# Patient Record
Sex: FEMALE | Race: WHITE | NOT HISPANIC OR LATINO | Employment: OTHER | URBAN - METROPOLITAN AREA
[De-identification: names, ages, dates, MRNs, and addresses within clinical notes are randomized per-mention and may not be internally consistent; named-entity substitution may affect disease eponyms.]

---

## 2023-01-25 PROBLEM — G89.29 CHRONIC PAIN OF BOTH SHOULDERS: Status: ACTIVE | Noted: 2023-01-25

## 2023-01-25 PROBLEM — L98.9 SKIN LESION OF HAND: Status: ACTIVE | Noted: 2023-01-25

## 2023-01-25 PROBLEM — F32.A DEPRESSION: Status: ACTIVE | Noted: 2023-01-25

## 2023-01-25 PROBLEM — M54.9 BACK PAIN: Status: ACTIVE | Noted: 2023-01-25

## 2023-01-25 PROBLEM — R79.89 ELEVATED TSH: Status: ACTIVE | Noted: 2023-01-25

## 2023-01-25 PROBLEM — M25.511 CHRONIC PAIN OF BOTH SHOULDERS: Status: ACTIVE | Noted: 2023-01-25

## 2023-01-25 PROBLEM — M79.604 RIGHT LEG PAIN: Status: ACTIVE | Noted: 2023-01-25

## 2023-01-25 PROBLEM — M25.551 PAIN IN RIGHT HIP: Status: ACTIVE | Noted: 2023-01-25

## 2023-01-25 PROBLEM — I51.9 IMPAIRED LEFT VENTRICULAR RELAXATION: Status: ACTIVE | Noted: 2023-01-25

## 2023-01-25 PROBLEM — K21.9 GERD (GASTROESOPHAGEAL REFLUX DISEASE): Status: ACTIVE | Noted: 2023-01-25

## 2023-01-25 PROBLEM — M16.0 PRIMARY LOCALIZED OSTEOARTHRITIS OF HIPS, BILATERAL: Status: ACTIVE | Noted: 2023-01-25

## 2023-01-25 PROBLEM — M79.89 LEG SWELLING: Status: ACTIVE | Noted: 2023-01-25

## 2023-01-25 PROBLEM — M19.011 OSTEOARTHRITIS OF BOTH SHOULDERS: Status: ACTIVE | Noted: 2023-01-25

## 2023-01-25 PROBLEM — R73.03 PREDIABETES: Status: ACTIVE | Noted: 2023-01-25

## 2023-01-25 PROBLEM — I10 BENIGN ESSENTIAL HYPERTENSION: Status: ACTIVE | Noted: 2023-01-25

## 2023-01-25 PROBLEM — M81.0 OSTEOPOROSIS: Status: ACTIVE | Noted: 2023-01-25

## 2023-01-25 PROBLEM — M19.012 OSTEOARTHRITIS OF BOTH SHOULDERS: Status: ACTIVE | Noted: 2023-01-25

## 2023-01-25 PROBLEM — I51.89 DIASTOLIC DYSFUNCTION: Status: ACTIVE | Noted: 2023-01-25

## 2023-01-25 PROBLEM — I73.9 CLAUDICATION OF LOWER EXTREMITY (CMS-HCC): Status: ACTIVE | Noted: 2023-01-25

## 2023-01-25 PROBLEM — L30.9 DERMATITIS: Status: ACTIVE | Noted: 2023-01-25

## 2023-01-25 PROBLEM — M25.512 CHRONIC PAIN OF BOTH SHOULDERS: Status: ACTIVE | Noted: 2023-01-25

## 2023-01-25 PROBLEM — K59.09 CHRONIC CONSTIPATION: Status: ACTIVE | Noted: 2023-01-25

## 2023-01-25 RX ORDER — CLONIDINE HYDROCHLORIDE 0.1 MG/1
1 TABLET ORAL NIGHTLY
COMMUNITY
Start: 2020-12-08 | End: 2023-10-26 | Stop reason: SDUPTHER

## 2023-01-25 RX ORDER — ALENDRONATE SODIUM 70 MG/1
1 TABLET ORAL
COMMUNITY
Start: 2020-12-08 | End: 2023-03-08 | Stop reason: SDUPTHER

## 2023-01-25 RX ORDER — BUPROPION HYDROCHLORIDE 300 MG/1
1 TABLET ORAL DAILY
COMMUNITY
Start: 2020-12-08 | End: 2023-03-08 | Stop reason: SDUPTHER

## 2023-01-25 RX ORDER — AMLODIPINE BESYLATE 5 MG/1
1 TABLET ORAL DAILY
COMMUNITY
Start: 2021-05-04 | End: 2024-05-13 | Stop reason: SDUPTHER

## 2023-01-25 RX ORDER — BISACODYL 5 MG
TABLET, DELAYED RELEASE (ENTERIC COATED) ORAL SEE ADMIN INSTRUCTIONS
COMMUNITY
Start: 2022-10-20

## 2023-01-25 RX ORDER — OMEPRAZOLE 20 MG/1
1 CAPSULE, DELAYED RELEASE ORAL
COMMUNITY
Start: 2020-12-08 | End: 2023-05-04 | Stop reason: SDUPTHER

## 2023-01-25 RX ORDER — TRIAMCINOLONE ACETONIDE 1 MG/G
CREAM TOPICAL 2 TIMES DAILY
COMMUNITY
Start: 2021-06-22

## 2023-01-25 RX ORDER — FUROSEMIDE 20 MG/1
2 TABLET ORAL DAILY
COMMUNITY
Start: 2022-04-04 | End: 2023-12-07 | Stop reason: SDUPTHER

## 2023-01-25 RX ORDER — OLANZAPINE 10 MG/1
1 TABLET ORAL NIGHTLY
COMMUNITY
Start: 2020-12-08 | End: 2023-04-11 | Stop reason: SDUPTHER

## 2023-01-25 RX ORDER — CROMOLYN SODIUM 40 MG/ML
1 SOLUTION/ DROPS OPHTHALMIC NIGHTLY
COMMUNITY
Start: 2021-12-02

## 2023-01-25 RX ORDER — SPIRONOLACTONE 25 MG/1
1 TABLET ORAL DAILY
COMMUNITY
Start: 2022-05-03 | End: 2024-04-09 | Stop reason: SDUPTHER

## 2023-03-01 LAB
ANION GAP IN SER/PLAS: 15 MMOL/L (ref 10–20)
CALCIDIOL (25 OH VITAMIN D3) (NG/ML) IN SER/PLAS: 33 NG/ML
CALCIUM (MG/DL) IN SER/PLAS: 9.6 MG/DL (ref 8.6–10.6)
CARBON DIOXIDE, TOTAL (MMOL/L) IN SER/PLAS: 25 MMOL/L (ref 21–32)
CHLORIDE (MMOL/L) IN SER/PLAS: 100 MMOL/L (ref 98–107)
CHOLESTEROL (MG/DL) IN SER/PLAS: 188 MG/DL (ref 0–199)
CHOLESTEROL IN HDL (MG/DL) IN SER/PLAS: 95.3 MG/DL
CHOLESTEROL/HDL RATIO: 2
CREATININE (MG/DL) IN SER/PLAS: 0.91 MG/DL (ref 0.5–1.05)
GFR FEMALE: 66 ML/MIN/1.73M2
GLUCOSE (MG/DL) IN SER/PLAS: 85 MG/DL (ref 74–99)
LDL: 83 MG/DL (ref 0–99)
POTASSIUM (MMOL/L) IN SER/PLAS: 4.3 MMOL/L (ref 3.5–5.3)
SODIUM (MMOL/L) IN SER/PLAS: 136 MMOL/L (ref 136–145)
TRIGLYCERIDE (MG/DL) IN SER/PLAS: 51 MG/DL (ref 0–149)
UREA NITROGEN (MG/DL) IN SER/PLAS: 19 MG/DL (ref 6–23)
VLDL: 10 MG/DL (ref 0–40)

## 2023-03-07 NOTE — PROGRESS NOTES
Subjective   6 month routine f/u visit    HPI   73 yo female presents for 6 MO F/U    PMH: OSTEOPOROSIS, HTN, ANXIETY/DEPRESSION, LE EDEMA, GERD, CHRONIC SHOULDER PAIN, ECZEMA    #PRE-DIABETES  A1C  due today; was 5.7%  in Sept 2022    #OSTEOPOROSIS  DEXA= 2021  taking Fosamax weekly; Saturday   BMI= 24  exercise: not currently     #HTN  Seeing cardiology, STEVE Xie  taking Amlodipine, Clonidine, Spironolactone  BP today= 112/79  CT cardiac scoring= 111 May 2022    #GERD  Taking Omeprazole 20 mg  sx: no concerns    #ANXIETY/DEPRESSION  Taking Wellbutrin 300 mg daily  mood: good   sleep: good     #OA RIGHT SHOULDER   Xray Oct 2021  Taking Tramadol PRN; states she takes 1 pill at bedtime every few weeks    #HM  FBW: done last week; reviewed during OV today   MAMM: hx of breast cancer w/bilateral mastectomy    DEXA: done 2021  COLON: 2018, due April 2024 w/Dr. Nolan   VACCINES: needs Prevnar 20 after May 2023    Review of Systems  All 13 systems were reviewed and are within normal limits except positive and pertinent negative responses which are noted below or in HPI.      Objective   /79   Pulse 71   Wt 64.4 kg (142 lb)   SpO2 96%   BMI 25.15 kg/m²     Physical Exam  Cardiovascular:      Rate and Rhythm: Normal rate.   Pulmonary:      Effort: Pulmonary effort is normal.      Breath sounds: Normal breath sounds.   Skin:     General: Skin is warm and dry.   Neurological:      Mental Status: She is alert.   Psychiatric:         Mood and Affect: Mood normal.         Behavior: Behavior normal.         Thought Content: Thought content normal.         Judgment: Judgment normal.         Assessment/Plan        Thank you for seeing me today.  It was a pleasure to see you again!    #PRE-DIABETES  A1C today= 5.8%  Diet and exercise encouraged     #INTERMITTENT CLAUDICATION  w/u NEG per cardiology  encouraged increasing daily activities and call if worsens    #OSTEOPOROSIS  DEXA= 2021  c/w Fosamax weekly  daily weight  bearing exercises encouraged   daily Vitamin D and Calcium    #HTN  Seeing cardiologySTEVE  c/w Amlodipine, Clonidine, Spironolactone  BP today= 112/73    #GERD  c/w Omeprazole 20 mg    #ANXIETY/DEPRESSION  c/w Wellbutrin 300 mg daily    #OA RIGHT SHOULDER   Xray Oct 2021  c/w Tramadol PRN    #HM  Get Shingrix at your pharmacy   Prevnar due after May 2023    RTC September FOR AWV AND AS NEEDED

## 2023-03-08 ENCOUNTER — OFFICE VISIT (OUTPATIENT)
Dept: PRIMARY CARE | Facility: CLINIC | Age: 73
End: 2023-03-08
Payer: MEDICARE

## 2023-03-08 VITALS
HEART RATE: 71 BPM | OXYGEN SATURATION: 96 % | DIASTOLIC BLOOD PRESSURE: 79 MMHG | BODY MASS INDEX: 25.15 KG/M2 | WEIGHT: 142 LBS | SYSTOLIC BLOOD PRESSURE: 112 MMHG

## 2023-03-08 DIAGNOSIS — K21.9 GASTROESOPHAGEAL REFLUX DISEASE WITHOUT ESOPHAGITIS: ICD-10-CM

## 2023-03-08 DIAGNOSIS — R73.03 PREDIABETES: ICD-10-CM

## 2023-03-08 DIAGNOSIS — I10 BENIGN ESSENTIAL HYPERTENSION: Primary | ICD-10-CM

## 2023-03-08 DIAGNOSIS — M81.0 OSTEOPOROSIS WITHOUT CURRENT PATHOLOGICAL FRACTURE, UNSPECIFIED OSTEOPOROSIS TYPE: ICD-10-CM

## 2023-03-08 DIAGNOSIS — M19.012 PRIMARY OSTEOARTHRITIS OF BOTH SHOULDERS: ICD-10-CM

## 2023-03-08 DIAGNOSIS — F32.2 CURRENT SEVERE EPISODE OF MAJOR DEPRESSIVE DISORDER WITHOUT PSYCHOTIC FEATURES, UNSPECIFIED WHETHER RECURRENT (MULTI): ICD-10-CM

## 2023-03-08 DIAGNOSIS — M19.011 PRIMARY OSTEOARTHRITIS OF BOTH SHOULDERS: ICD-10-CM

## 2023-03-08 LAB — POC HEMOGLOBIN: 5.8 G/DL (ref 12–16)

## 2023-03-08 PROCEDURE — 3074F SYST BP LT 130 MM HG: CPT | Performed by: NURSE PRACTITIONER

## 2023-03-08 PROCEDURE — 3078F DIAST BP <80 MM HG: CPT | Performed by: NURSE PRACTITIONER

## 2023-03-08 PROCEDURE — 1160F RVW MEDS BY RX/DR IN RCRD: CPT | Performed by: NURSE PRACTITIONER

## 2023-03-08 PROCEDURE — 99213 OFFICE O/P EST LOW 20 MIN: CPT | Performed by: NURSE PRACTITIONER

## 2023-03-08 PROCEDURE — 85018 HEMOGLOBIN: CPT | Performed by: NURSE PRACTITIONER

## 2023-03-08 PROCEDURE — 1159F MED LIST DOCD IN RCRD: CPT | Performed by: NURSE PRACTITIONER

## 2023-03-08 PROCEDURE — 36416 COLLJ CAPILLARY BLOOD SPEC: CPT | Performed by: NURSE PRACTITIONER

## 2023-03-08 PROCEDURE — 1036F TOBACCO NON-USER: CPT | Performed by: NURSE PRACTITIONER

## 2023-03-08 RX ORDER — TRAMADOL HYDROCHLORIDE 50 MG/1
50 TABLET ORAL EVERY 8 HOURS PRN
Qty: 21 TABLET | Refills: 0 | Status: SHIPPED | OUTPATIENT
Start: 2023-03-08 | End: 2023-03-15

## 2023-03-08 RX ORDER — ALENDRONATE SODIUM 70 MG/1
70 TABLET ORAL
Qty: 12 TABLET | Refills: 3 | Status: SHIPPED | OUTPATIENT
Start: 2023-03-08 | End: 2024-04-09 | Stop reason: SDUPTHER

## 2023-03-08 RX ORDER — BUPROPION HYDROCHLORIDE 300 MG/1
300 TABLET ORAL DAILY
Qty: 90 TABLET | Refills: 3 | Status: SHIPPED | OUTPATIENT
Start: 2023-03-08 | End: 2024-02-05 | Stop reason: SDUPTHER

## 2023-03-08 NOTE — PATIENT INSTRUCTIONS
Thank you for seeing me today.  It was a pleasure to see you again!    #PRE-DIABETES  A1C today= 5.8%  Diet and exercise encouraged     #INTERMITTENT CLAUDICATION  w/u NEG per cardiology  encouraged increasing daily activities and call if worsens    #OSTEOPOROSIS  DEXA= 2021  c/w Fosamax weekly  daily weight bearing exercises encouraged   daily Vitamin D and Calcium    #HTN  Seeing cardiologySTEVE  c/w Amlodipine, Clonidine, Spironolactone  BP today= 112/73    #GERD  c/w Omeprazole 20 mg    #ANXIETY/DEPRESSION  c/w Wellbutrin 300 mg daily    #OA RIGHT SHOULDER   Xray Oct 2021  c/w Tramadol PRN    #HM  Get Shingrix at your pharmacy   Prevnar due after May 2023    RTC September FOR AWV AND AS NEEDED

## 2023-03-25 ENCOUNTER — PATIENT MESSAGE (OUTPATIENT)
Dept: PRIMARY CARE | Facility: CLINIC | Age: 73
End: 2023-03-25
Payer: MEDICARE

## 2023-03-25 DIAGNOSIS — R29.6 FALLS FREQUENTLY: Primary | ICD-10-CM

## 2023-04-11 DIAGNOSIS — I10 BENIGN ESSENTIAL HYPERTENSION: Primary | ICD-10-CM

## 2023-04-11 RX ORDER — OLANZAPINE 10 MG/1
10 TABLET ORAL NIGHTLY
Qty: 90 TABLET | Refills: 1 | Status: SHIPPED | OUTPATIENT
Start: 2023-04-11 | End: 2023-10-10

## 2023-05-04 DIAGNOSIS — K21.00 GASTROESOPHAGEAL REFLUX DISEASE WITH ESOPHAGITIS WITHOUT HEMORRHAGE: ICD-10-CM

## 2023-05-04 RX ORDER — OMEPRAZOLE 20 MG/1
20 CAPSULE, DELAYED RELEASE ORAL
Qty: 90 CAPSULE | Refills: 3 | Status: SHIPPED | OUTPATIENT
Start: 2023-05-04

## 2023-06-27 ENCOUNTER — TELEPHONE (OUTPATIENT)
Dept: PRIMARY CARE | Facility: CLINIC | Age: 73
End: 2023-06-27
Payer: MEDICARE

## 2023-06-27 NOTE — TELEPHONE ENCOUNTER
"Left message for pt regarding email that I received from cardiology, Shirley on 6/26/23. Adry informed me that pt was seen in office and c/o \"forgetfulness\" and feels she may have had a TIA  Cardiology ordered US Carotid and Holter Monitor  I left pt VM to call and schedule appt so we could discuss.  Her next OV is Sept 2023  "

## 2023-07-05 ENCOUNTER — APPOINTMENT (OUTPATIENT)
Dept: PRIMARY CARE | Facility: CLINIC | Age: 73
End: 2023-07-05
Payer: MEDICARE

## 2023-07-06 ENCOUNTER — OFFICE VISIT (OUTPATIENT)
Dept: PRIMARY CARE | Facility: CLINIC | Age: 73
End: 2023-07-06
Payer: MEDICARE

## 2023-07-06 VITALS
BODY MASS INDEX: 25.33 KG/M2 | DIASTOLIC BLOOD PRESSURE: 85 MMHG | HEART RATE: 84 BPM | SYSTOLIC BLOOD PRESSURE: 123 MMHG | WEIGHT: 143 LBS

## 2023-07-06 DIAGNOSIS — G89.29 CHRONIC PAIN OF BOTH SHOULDERS: ICD-10-CM

## 2023-07-06 DIAGNOSIS — M25.511 CHRONIC PAIN OF BOTH SHOULDERS: ICD-10-CM

## 2023-07-06 DIAGNOSIS — G31.84 MILD COGNITIVE IMPAIRMENT: Primary | ICD-10-CM

## 2023-07-06 DIAGNOSIS — M25.512 CHRONIC PAIN OF BOTH SHOULDERS: ICD-10-CM

## 2023-07-06 PROBLEM — R26.2 DIFFICULTY WALKING: Status: ACTIVE | Noted: 2023-07-06

## 2023-07-06 PROBLEM — R42 DIZZINESS: Status: ACTIVE | Noted: 2023-07-06

## 2023-07-06 PROBLEM — R00.2 HEART PALPITATIONS: Status: ACTIVE | Noted: 2023-07-06

## 2023-07-06 PROBLEM — R09.89 CAROTID BRUIT: Status: ACTIVE | Noted: 2023-07-06

## 2023-07-06 PROCEDURE — 1036F TOBACCO NON-USER: CPT | Performed by: NURSE PRACTITIONER

## 2023-07-06 PROCEDURE — 99213 OFFICE O/P EST LOW 20 MIN: CPT | Performed by: NURSE PRACTITIONER

## 2023-07-06 PROCEDURE — 3079F DIAST BP 80-89 MM HG: CPT | Performed by: NURSE PRACTITIONER

## 2023-07-06 PROCEDURE — 1160F RVW MEDS BY RX/DR IN RCRD: CPT | Performed by: NURSE PRACTITIONER

## 2023-07-06 PROCEDURE — 1159F MED LIST DOCD IN RCRD: CPT | Performed by: NURSE PRACTITIONER

## 2023-07-06 PROCEDURE — 3074F SYST BP LT 130 MM HG: CPT | Performed by: NURSE PRACTITIONER

## 2023-07-06 RX ORDER — NALOXONE HYDROCHLORIDE 4 MG/.1ML
4 SPRAY NASAL AS NEEDED
Qty: 2 EACH | Refills: 0 | Status: SHIPPED | OUTPATIENT
Start: 2023-07-06 | End: 2024-07-05

## 2023-07-06 RX ORDER — TRAMADOL HYDROCHLORIDE 50 MG/1
50 TABLET ORAL DAILY PRN
COMMUNITY
Start: 2020-10-01 | End: 2023-07-06 | Stop reason: SDUPTHER

## 2023-07-06 RX ORDER — KETOROLAC TROMETHAMINE 5 MG/ML
1 SOLUTION OPHTHALMIC EVERY 12 HOURS PRN
COMMUNITY
Start: 2019-03-26

## 2023-07-06 RX ORDER — PNV NO.95/FERROUS FUM/FOLIC AC 28MG-0.8MG
1 TABLET ORAL 2 TIMES DAILY
COMMUNITY
Start: 2008-07-07

## 2023-07-06 RX ORDER — CLONAZEPAM 1 MG/1
1 TABLET ORAL
COMMUNITY
Start: 2020-10-01

## 2023-07-06 RX ORDER — TRAMADOL HYDROCHLORIDE 50 MG/1
50 TABLET ORAL DAILY PRN
Qty: 10 TABLET | Refills: 0 | Status: SHIPPED | OUTPATIENT
Start: 2023-07-06

## 2023-07-06 ASSESSMENT — MONTREAL COGNITIVE ASSESSMENT (MOCA)
4. NAME EACH OF THE THREE ANIMALS SHOWN: 1
5. MEMORY TRIALS: 0
VISUOSPATIAL/EXECUTIVE SUBSCORE: 1

## 2023-07-06 NOTE — PATIENT INSTRUCTIONS
Thank you for seeing me today.  It was a pleasure to see you again!    #COGNITIVE IMPAIRMENT, MILD  US Carotid Negative   SLUMS= 26/30 (see paper eval)  CT brain  See Neurology     RTC AS NEEDED

## 2023-08-18 DIAGNOSIS — M79.604 RIGHT LEG PAIN: Primary | ICD-10-CM

## 2023-08-24 LAB
ANION GAP IN SER/PLAS: 15 MMOL/L (ref 10–20)
CALCIUM (MG/DL) IN SER/PLAS: 9.5 MG/DL (ref 8.6–10.6)
CARBON DIOXIDE, TOTAL (MMOL/L) IN SER/PLAS: 25 MMOL/L (ref 21–32)
CHLORIDE (MMOL/L) IN SER/PLAS: 97 MMOL/L (ref 98–107)
COBALAMIN (VITAMIN B12) (PG/ML) IN SER/PLAS: 343 PG/ML (ref 211–911)
CREATININE (MG/DL) IN SER/PLAS: 0.9 MG/DL (ref 0.5–1.05)
GFR FEMALE: 67 ML/MIN/1.73M2
GLUCOSE (MG/DL) IN SER/PLAS: 96 MG/DL (ref 74–99)
POTASSIUM (MMOL/L) IN SER/PLAS: 4.3 MMOL/L (ref 3.5–5.3)
SODIUM (MMOL/L) IN SER/PLAS: 133 MMOL/L (ref 136–145)
THYROTROPIN (MIU/L) IN SER/PLAS BY DETECTION LIMIT <= 0.05 MIU/L: 3.89 MIU/L (ref 0.44–3.98)
UREA NITROGEN (MG/DL) IN SER/PLAS: 15 MG/DL (ref 6–23)

## 2023-08-26 PROBLEM — R21 RASH: Status: ACTIVE | Noted: 2023-08-26

## 2023-08-26 PROBLEM — E87.1 HYPONATREMIA: Status: ACTIVE | Noted: 2018-03-27

## 2023-08-26 PROBLEM — E78.5 HYPERLIPIDEMIA LDL GOAL <70: Status: ACTIVE | Noted: 2018-01-23

## 2023-08-26 PROBLEM — M25.511 RIGHT SHOULDER PAIN: Status: ACTIVE | Noted: 2023-08-26

## 2023-08-26 PROBLEM — R11.2 NAUSEA & VOMITING: Status: ACTIVE | Noted: 2023-08-26

## 2023-08-26 PROBLEM — K57.30 SIGMOID DIVERTICULOSIS: Status: ACTIVE | Noted: 2018-08-14

## 2023-08-26 PROBLEM — F41.1 GAD (GENERALIZED ANXIETY DISORDER): Chronic | Status: ACTIVE | Noted: 2018-04-26

## 2023-08-26 PROBLEM — E78.00 PURE HYPERCHOLESTEROLEMIA: Chronic | Status: ACTIVE | Noted: 2018-12-06

## 2023-08-26 PROBLEM — E87.6 HYPOKALEMIA: Status: ACTIVE | Noted: 2018-03-27

## 2023-08-26 PROBLEM — M19.012 GLENOHUMERAL ARTHRITIS, LEFT: Status: ACTIVE | Noted: 2018-08-20

## 2023-08-26 PROBLEM — M25.552 LEFT HIP PAIN: Status: ACTIVE | Noted: 2023-08-26

## 2023-08-26 PROBLEM — M81.0 SENILE OSTEOPOROSIS: Chronic | Status: ACTIVE | Noted: 2018-12-06

## 2023-08-26 PROBLEM — M19.011 GLENOHUMERAL ARTHRITIS, RIGHT: Status: ACTIVE | Noted: 2018-08-20

## 2023-08-26 PROBLEM — F32.1 CURRENT MODERATE EPISODE OF MAJOR DEPRESSIVE DISORDER (MULTI): Chronic | Status: ACTIVE | Noted: 2018-12-06

## 2023-08-26 PROBLEM — M15.0 PRIMARY OSTEOARTHRITIS INVOLVING MULTIPLE JOINTS: Chronic | Status: ACTIVE | Noted: 2018-03-22

## 2023-08-26 PROBLEM — R41.3 MEMORY DIFFICULTY: Status: ACTIVE | Noted: 2023-08-26

## 2023-08-26 PROBLEM — R25.1 TREMOR: Status: ACTIVE | Noted: 2023-08-26

## 2023-08-26 PROBLEM — R44.1 VISUAL HALLUCINATIONS: Status: ACTIVE | Noted: 2023-08-26

## 2023-08-26 PROBLEM — M15.9 PRIMARY OSTEOARTHRITIS INVOLVING MULTIPLE JOINTS: Chronic | Status: ACTIVE | Noted: 2018-03-22

## 2023-08-26 PROBLEM — M75.50 SUBACROMIAL BURSITIS: Status: ACTIVE | Noted: 2017-01-25

## 2023-08-26 RX ORDER — BENZTROPINE MESYLATE 1 MG/1
TABLET ORAL 3 TIMES DAILY PRN
COMMUNITY

## 2023-08-26 RX ORDER — CLONIDINE HYDROCHLORIDE 0.1 MG/1
0.5 TABLET ORAL 2 TIMES DAILY
COMMUNITY
Start: 2019-10-19 | End: 2023-12-07 | Stop reason: SDUPTHER

## 2023-08-26 RX ORDER — MULTIVITAMIN
1 TABLET ORAL
COMMUNITY
Start: 2014-08-11

## 2023-08-26 RX ORDER — OMEPRAZOLE 40 MG/1
1 CAPSULE, DELAYED RELEASE ORAL
COMMUNITY
Start: 2020-04-20 | End: 2023-10-26 | Stop reason: SDUPTHER

## 2023-08-26 RX ORDER — ALPRAZOLAM 1 MG/1
1 TABLET ORAL 4 TIMES DAILY PRN
COMMUNITY

## 2023-08-26 RX ORDER — ROPINIROLE 0.5 MG/1
1 TABLET, FILM COATED ORAL NIGHTLY PRN
COMMUNITY

## 2023-08-26 RX ORDER — ALPRAZOLAM 0.25 MG/1
1 TABLET ORAL
COMMUNITY
Start: 2023-08-17

## 2023-08-26 RX ORDER — OLANZAPINE 5 MG/1
1 TABLET ORAL DAILY
COMMUNITY
End: 2023-10-10

## 2023-08-26 RX ORDER — VALACYCLOVIR HYDROCHLORIDE 1 G/1
1 TABLET, FILM COATED ORAL 3 TIMES DAILY
COMMUNITY

## 2023-09-13 ENCOUNTER — APPOINTMENT (OUTPATIENT)
Dept: PRIMARY CARE | Facility: CLINIC | Age: 73
End: 2023-09-13
Payer: MEDICARE

## 2023-10-10 DIAGNOSIS — I10 BENIGN ESSENTIAL HYPERTENSION: ICD-10-CM

## 2023-10-10 RX ORDER — OLANZAPINE 10 MG/1
10 TABLET ORAL NIGHTLY
Qty: 90 TABLET | Refills: 1 | Status: SHIPPED | OUTPATIENT
Start: 2023-10-10 | End: 2024-04-09 | Stop reason: SDUPTHER

## 2023-10-25 NOTE — PROGRESS NOTES
Subjective   Reason for Visit: Mackenzie Baker is an 73 y.o. female here for a Medicare Wellness visit.               HPI  Est 74 yo female presents today for AWV     PMH: OSTEOPOROSIS, HTN, ANXIETY/DEPRESSION, LE EDEMA, GERD, CHRONIC SHOULDER PAIN, ECZEMA, HFpEF       Pt had concerns about her memory, SLUMS= 26/30 in July 2023   Pt was referred to neurology, Dr. Doty--->     #PRE-DIABETES  5.8% March 2023     #OSTEOPOROSIS  DEXA= 2021  taking Fosamax weekly on Saturday   BMI=   exercise: not currently      #HTN  Seeing cardiology, STEVE Xie  taking Amlodipine, Clonidine, Spironolactone, Lasix   BP today=   CT cardiac scoring= 111 May 2022     #GERD  Taking Omeprazole 20 mg  sx: no concerns     #ANXIETY/DEPRESSION  Taking Wellbutrin 300 mg daily  mood: good   sleep: good      #OA RIGHT SHOULDER   Xray Oct 2021  Taking Tramadol PRN; states she takes 1 pill at bedtime every few weeks     #HM  FBW: UTD  MAMM: hx of breast cancer w/bilateral mastectomy    DEXA: 2021  COLON: 2018, due April 2024 w/Dr. Nolan   VACCINES:       Patient Care Team:  KOSTAS Bush-CNP as PCP - General (Family Medicine)       Review of Systems  All 13 systems were reviewed and are within normal limits except positive and pertinent negative responses which are noted below or in HPI.      Objective   Vitals:  There were no vitals taken for this visit.      Physical Exam    Assessment/Plan     {Assess/Plan SmartLinks (Optional):47360}

## 2023-10-26 ENCOUNTER — OFFICE VISIT (OUTPATIENT)
Dept: PRIMARY CARE | Facility: CLINIC | Age: 73
End: 2023-10-26
Payer: MEDICARE

## 2023-10-26 VITALS
HEART RATE: 80 BPM | DIASTOLIC BLOOD PRESSURE: 82 MMHG | BODY MASS INDEX: 26.4 KG/M2 | WEIGHT: 149 LBS | HEIGHT: 63 IN | SYSTOLIC BLOOD PRESSURE: 119 MMHG

## 2023-10-26 DIAGNOSIS — E78.5 HYPERLIPIDEMIA LDL GOAL <70: ICD-10-CM

## 2023-10-26 DIAGNOSIS — I10 BENIGN ESSENTIAL HYPERTENSION: ICD-10-CM

## 2023-10-26 DIAGNOSIS — Z00.00 MEDICARE ANNUAL WELLNESS VISIT, SUBSEQUENT: Primary | ICD-10-CM

## 2023-10-26 DIAGNOSIS — R41.3 MEMORY DIFFICULTY: ICD-10-CM

## 2023-10-26 DIAGNOSIS — M81.0 AGE-RELATED OSTEOPOROSIS WITHOUT CURRENT PATHOLOGICAL FRACTURE: ICD-10-CM

## 2023-10-26 DIAGNOSIS — Z00.00 ROUTINE GENERAL MEDICAL EXAMINATION AT HEALTH CARE FACILITY: ICD-10-CM

## 2023-10-26 DIAGNOSIS — F33.1 MODERATE EPISODE OF RECURRENT MAJOR DEPRESSIVE DISORDER (MULTI): Chronic | ICD-10-CM

## 2023-10-26 DIAGNOSIS — R73.03 PREDIABETES: ICD-10-CM

## 2023-10-26 PROBLEM — R21 RASH: Status: RESOLVED | Noted: 2023-08-26 | Resolved: 2023-10-26

## 2023-10-26 PROBLEM — E87.6 HYPOKALEMIA: Status: RESOLVED | Noted: 2018-03-27 | Resolved: 2023-10-26

## 2023-10-26 PROBLEM — I73.9 CLAUDICATION OF LOWER EXTREMITY (CMS-HCC): Status: RESOLVED | Noted: 2023-01-25 | Resolved: 2023-10-26

## 2023-10-26 PROBLEM — M79.89 LEG SWELLING: Status: RESOLVED | Noted: 2023-01-25 | Resolved: 2023-10-26

## 2023-10-26 PROBLEM — R42 DIZZINESS: Status: RESOLVED | Noted: 2023-07-06 | Resolved: 2023-10-26

## 2023-10-26 PROCEDURE — 99213 OFFICE O/P EST LOW 20 MIN: CPT | Performed by: NURSE PRACTITIONER

## 2023-10-26 PROCEDURE — 1170F FXNL STATUS ASSESSED: CPT | Performed by: NURSE PRACTITIONER

## 2023-10-26 PROCEDURE — 3079F DIAST BP 80-89 MM HG: CPT | Performed by: NURSE PRACTITIONER

## 2023-10-26 PROCEDURE — 1160F RVW MEDS BY RX/DR IN RCRD: CPT | Performed by: NURSE PRACTITIONER

## 2023-10-26 PROCEDURE — 3074F SYST BP LT 130 MM HG: CPT | Performed by: NURSE PRACTITIONER

## 2023-10-26 PROCEDURE — 1159F MED LIST DOCD IN RCRD: CPT | Performed by: NURSE PRACTITIONER

## 2023-10-26 PROCEDURE — 1036F TOBACCO NON-USER: CPT | Performed by: NURSE PRACTITIONER

## 2023-10-26 PROCEDURE — G0439 PPPS, SUBSEQ VISIT: HCPCS | Performed by: NURSE PRACTITIONER

## 2023-10-26 ASSESSMENT — ACTIVITIES OF DAILY LIVING (ADL)
DRESSING: INDEPENDENT
BATHING: INDEPENDENT
MANAGING_FINANCES: INDEPENDENT
DOING_HOUSEWORK: INDEPENDENT
TAKING_MEDICATION: INDEPENDENT
GROCERY_SHOPPING: INDEPENDENT

## 2023-10-26 ASSESSMENT — PATIENT HEALTH QUESTIONNAIRE - PHQ9
SUM OF ALL RESPONSES TO PHQ9 QUESTIONS 1 AND 2: 0
1. LITTLE INTEREST OR PLEASURE IN DOING THINGS: NOT AT ALL
2. FEELING DOWN, DEPRESSED OR HOPELESS: NOT AT ALL

## 2023-10-26 ASSESSMENT — ENCOUNTER SYMPTOMS
DEPRESSION: 0
LOSS OF SENSATION IN FEET: 0
OCCASIONAL FEELINGS OF UNSTEADINESS: 0

## 2023-10-26 NOTE — PROGRESS NOTES
"Subjective   Reason for Visit: Mackenzie Baker is an 73 y.o. female here for a Medicare Wellness visit.     Past Medical, Surgical, and Family History reviewed and updated in chart.    Reviewed all medications by prescribing practitioner or clinical pharmacist (such as prescriptions, OTCs, herbal therapies and supplements) and documented in the medical record.    HPI  Est 72 yo female presents today for AWV     PMH: OSTEOPOROSIS, HTN, ANXIETY/DEPRESSION, LE EDEMA, GERD, CHRONIC SHOULDER PAIN, ECZEMA, HFpEF       Pt had concerns about her memory, SLUMS= 26/30 in July 2023   Pt was referred to neurology, Dr. Doty---> had appt in Sept 2023, had MRI, results w/o acute findings, chronic age related changes noted, no meds   Pt states she feels well    #PRE-DIABETES  5.8% March 2023     #OSTEOPOROSIS  DEXA= 2021, req to repeat in 2024  taking Fosamax weekly on Saturday   BMI= 26  exercise: not currently      #HTN  Seeing cardiology, STEVE Xie  taking Amlodipine, Clonidine, Spironolactone, Lasix   BP today= 119/82  CT cardiac scoring= 111 May 2022     #GERD  Taking Omeprazole 20 mg  sx: no concerns     #ANXIETY/DEPRESSION  Taking Wellbutrin 300 mg daily  mood: good   sleep: good      #OA RIGHT SHOULDER   Xray Oct 2021  Taking Tramadol PRN; states she takes 1 pill at bedtime every few weeks     #HM  FBW: UTD  MAMM: hx of breast cancer w/bilateral mastectomy    DEXA: 2021  COLON: 2018, due April 2024 w/Dr. Nolan   VACCINES: UTD     Patient Care Team:  KOSTAS Bush-CNP as PCP - General (Family Medicine)       Review of Systems  All 13 systems were reviewed and are within normal limits except positive and pertinent negative responses which are noted below or in HPI.      Objective   Vitals:  /82   Pulse 80   Ht 1.6 m (5' 3\")   Wt 67.6 kg (149 lb)   BMI 26.39 kg/m²       Physical Exam  Vitals reviewed.   Cardiovascular:      Pulses: Normal pulses.   Pulmonary:      Effort: Pulmonary effort is normal.      " Breath sounds: Normal breath sounds.   Abdominal:      General: Bowel sounds are normal.   Skin:     General: Skin is warm and dry.      Capillary Refill: Capillary refill takes less than 2 seconds.   Neurological:      Mental Status: She is alert.   Psychiatric:         Mood and Affect: Mood normal.         Assessment/Plan     Problem List Items Addressed This Visit             ICD-10-CM    Benign essential hypertension I10    Osteoporosis M81.0    Prediabetes R73.03    Current moderate episode of major depressive disorder (CMS/[facility]) (Chronic) F32.1    Hyperlipidemia LDL goal <70 E78.5    Memory difficulty R41.3     Other Visit Diagnoses         Codes    Medicare annual wellness visit, subsequent    -  Primary Z00.00    Routine general medical examination at health care facility     Z00.00

## 2023-10-26 NOTE — PATIENT INSTRUCTIONS
Thank you for seeing me today.  It was a pleasure to see you again!    Today we did your Annual Medicare Wellness Exam and discussed the following:     Continue all medications    RTC ANNUALLY AND AS NEEDED

## 2023-12-07 DIAGNOSIS — I10 ESSENTIAL HYPERTENSION: Primary | ICD-10-CM

## 2023-12-08 RX ORDER — CLONIDINE HYDROCHLORIDE 0.1 MG/1
0.05 TABLET ORAL 2 TIMES DAILY
Qty: 90 TABLET | Refills: 1 | Status: SHIPPED | OUTPATIENT
Start: 2023-12-08 | End: 2024-06-05

## 2023-12-08 RX ORDER — FUROSEMIDE 20 MG/1
40 TABLET ORAL DAILY
Qty: 180 TABLET | Refills: 1 | Status: SHIPPED | OUTPATIENT
Start: 2023-12-08 | End: 2024-06-05

## 2024-01-15 NOTE — PROGRESS NOTES
"Mackenzie Baker is a 73 y.o. female who presents today for a sick visit    Chief Complaint   Patient presents with    Sore Throat       Symptoms: SORE THROAT   Pt states she went to  on Friday  Covid negative   Flu negative  No throat swab done   Pt denies fevers/chills      Symptom onset has been acute for a time period of 3 week(s).     Severity is described as mild-moderate.     Course of her symptoms over time is constant.    Has taken: Advil cold/sinus      Review of Systems  All 13 systems were reviewed and are within normal limits except positive and pertinent negative responses which are noted below or in HPI.        Objective   Vitals:  /84 (BP Location: Left arm)   Pulse 96   Ht 1.6 m (5' 3\")   Wt 68.4 kg (150 lb 12.8 oz)   SpO2 95%   BMI 26.71 kg/m²         Physical Exam  Vitals reviewed.   HENT:      Right Ear: Tympanic membrane normal.      Left Ear: Tympanic membrane normal.      Mouth/Throat:      Mouth: Mucous membranes are moist.      Pharynx: Pharyngeal swelling and posterior oropharyngeal erythema present.      Tonsils: 2+ on the right.   Eyes:      Conjunctiva/sclera: Conjunctivae normal.   Pulmonary:      Effort: Pulmonary effort is normal.   Neurological:      Mental Status: She is alert.         Assessment/Plan   Problem List Items Addressed This Visit    None  Visit Diagnoses         Codes    Sore throat    -  Primary J02.9    Relevant Orders    POCT Rapid Strep A manually resulted            "

## 2024-01-16 ENCOUNTER — OFFICE VISIT (OUTPATIENT)
Dept: PRIMARY CARE | Facility: CLINIC | Age: 74
End: 2024-01-16
Payer: MEDICARE

## 2024-01-16 VITALS
OXYGEN SATURATION: 95 % | DIASTOLIC BLOOD PRESSURE: 84 MMHG | BODY MASS INDEX: 26.72 KG/M2 | HEIGHT: 63 IN | SYSTOLIC BLOOD PRESSURE: 127 MMHG | WEIGHT: 150.8 LBS | HEART RATE: 96 BPM

## 2024-01-16 DIAGNOSIS — F33.1 MODERATE EPISODE OF RECURRENT MAJOR DEPRESSIVE DISORDER (MULTI): ICD-10-CM

## 2024-01-16 DIAGNOSIS — J02.9 SORE THROAT: ICD-10-CM

## 2024-01-16 DIAGNOSIS — J03.90 ACUTE TONSILLITIS, UNSPECIFIED ETIOLOGY: Primary | ICD-10-CM

## 2024-01-16 LAB — POC RAPID STREP: NEGATIVE

## 2024-01-16 PROCEDURE — 1036F TOBACCO NON-USER: CPT | Performed by: NURSE PRACTITIONER

## 2024-01-16 PROCEDURE — 1160F RVW MEDS BY RX/DR IN RCRD: CPT | Performed by: NURSE PRACTITIONER

## 2024-01-16 PROCEDURE — 3074F SYST BP LT 130 MM HG: CPT | Performed by: NURSE PRACTITIONER

## 2024-01-16 PROCEDURE — 3079F DIAST BP 80-89 MM HG: CPT | Performed by: NURSE PRACTITIONER

## 2024-01-16 PROCEDURE — 99212 OFFICE O/P EST SF 10 MIN: CPT | Performed by: NURSE PRACTITIONER

## 2024-01-16 PROCEDURE — 87880 STREP A ASSAY W/OPTIC: CPT | Performed by: NURSE PRACTITIONER

## 2024-01-16 PROCEDURE — 1159F MED LIST DOCD IN RCRD: CPT | Performed by: NURSE PRACTITIONER

## 2024-01-16 RX ORDER — METHYLPREDNISOLONE 4 MG/1
TABLET ORAL
Qty: 21 TABLET | Refills: 0 | Status: SHIPPED | OUTPATIENT
Start: 2024-01-16 | End: 2024-01-23

## 2024-01-16 NOTE — PATIENT INSTRUCTIONS
Thank you for seeing me today.  It was a pleasure to see you again!    #TONSILLITIS  Your strep test was negative, but I sent a steroid to help with the inflammation/swelling  You can also do warm, salt water gargles and take Tylenol 650 mg every 4 hours    RTC October and as needed

## 2024-02-05 DIAGNOSIS — F32.2 CURRENT SEVERE EPISODE OF MAJOR DEPRESSIVE DISORDER WITHOUT PSYCHOTIC FEATURES, UNSPECIFIED WHETHER RECURRENT (MULTI): ICD-10-CM

## 2024-02-05 RX ORDER — BUPROPION HYDROCHLORIDE 300 MG/1
300 TABLET ORAL DAILY
Qty: 90 TABLET | Refills: 3 | Status: SHIPPED | OUTPATIENT
Start: 2024-02-05

## 2024-02-09 DIAGNOSIS — Z12.11 ENCOUNTER FOR SCREENING FOR MALIGNANT NEOPLASM OF COLON: Primary | ICD-10-CM

## 2024-04-09 DIAGNOSIS — I10 ESSENTIAL HYPERTENSION: Primary | ICD-10-CM

## 2024-04-09 DIAGNOSIS — M81.0 OSTEOPOROSIS WITHOUT CURRENT PATHOLOGICAL FRACTURE, UNSPECIFIED OSTEOPOROSIS TYPE: ICD-10-CM

## 2024-04-09 DIAGNOSIS — I10 BENIGN ESSENTIAL HYPERTENSION: ICD-10-CM

## 2024-04-09 RX ORDER — OLANZAPINE 10 MG/1
10 TABLET ORAL NIGHTLY
Qty: 90 TABLET | Refills: 3 | Status: SHIPPED | OUTPATIENT
Start: 2024-04-09

## 2024-04-09 RX ORDER — ALENDRONATE SODIUM 70 MG/1
70 TABLET ORAL
Qty: 12 TABLET | Refills: 3 | Status: SHIPPED | OUTPATIENT
Start: 2024-04-09

## 2024-04-09 RX ORDER — SPIRONOLACTONE 25 MG/1
25 TABLET ORAL DAILY
Qty: 90 TABLET | Refills: 0 | Status: SHIPPED | OUTPATIENT
Start: 2024-04-09

## 2024-04-22 ENCOUNTER — APPOINTMENT (OUTPATIENT)
Dept: GASTROENTEROLOGY | Facility: EXTERNAL LOCATION | Age: 74
End: 2024-04-22
Payer: MEDICARE

## 2024-05-13 DIAGNOSIS — I10 BENIGN ESSENTIAL HYPERTENSION: ICD-10-CM

## 2024-05-13 RX ORDER — AMLODIPINE BESYLATE 5 MG/1
5 TABLET ORAL DAILY
Qty: 90 TABLET | Refills: 0 | Status: SHIPPED | OUTPATIENT
Start: 2024-05-13

## 2024-06-12 DIAGNOSIS — R26.89 BALANCE PROBLEM: ICD-10-CM

## 2024-06-12 DIAGNOSIS — R29.6 FALLS FREQUENTLY: Primary | ICD-10-CM

## 2024-06-18 DIAGNOSIS — I10 ESSENTIAL HYPERTENSION: ICD-10-CM

## 2024-06-18 RX ORDER — CLONIDINE HYDROCHLORIDE 0.1 MG/1
0.1 TABLET ORAL DAILY
Qty: 30 TABLET | Refills: 0 | Status: SHIPPED | OUTPATIENT
Start: 2024-06-18 | End: 2024-07-18

## 2024-06-24 ENCOUNTER — EVALUATION (OUTPATIENT)
Dept: PHYSICAL THERAPY | Facility: CLINIC | Age: 74
End: 2024-06-24
Payer: MEDICARE

## 2024-06-24 DIAGNOSIS — R26.89 BALANCE PROBLEM: ICD-10-CM

## 2024-06-24 DIAGNOSIS — R26.89 BALANCE DISORDER: Primary | ICD-10-CM

## 2024-06-24 DIAGNOSIS — R29.6 FALLS FREQUENTLY: ICD-10-CM

## 2024-06-24 PROCEDURE — 97112 NEUROMUSCULAR REEDUCATION: CPT | Mod: GP | Performed by: PHYSICAL THERAPIST

## 2024-06-24 PROCEDURE — 97161 PT EVAL LOW COMPLEX 20 MIN: CPT | Mod: GP | Performed by: PHYSICAL THERAPIST

## 2024-06-24 ASSESSMENT — ENCOUNTER SYMPTOMS
OCCASIONAL FEELINGS OF UNSTEADINESS: 1
LOSS OF SENSATION IN FEET: 0
DEPRESSION: 0

## 2024-06-24 NOTE — PROGRESS NOTES
"Physical Therapy    Physical Therapy Evaluation and Treatment    Patient Name: Mackenzie Baker  MRN: [id number]  Today's Date: 6/24/2024 6/24-9/2/2025 cert sent     MEDICARE A/B ACTIVE:  $240 DED (MET)/ 80% COVERAGE/ $0 USED PT/ST TO DATE/ MMO ACTIVE SECONDARY/ [place] VISITS NO AUTH/ PER RTE  Time Calculation  Start Time: 1100  Stop Time: 1148  Time Calculation (min): 48 min    Current Problem:   1. Balance disorder  Follow Up In Physical Therapy      2. Falls frequently  Referral to Physical Therapy      3. Balance problem  Referral to Physical Therapy          Relevant Past Medical History:per EMR Back pain, HTN, GERD, OP-takes Fosamax,O/A B shoulders, RT leg/hip pain,LT hip pain, mm tear RT knee,pre diabetic,depressive disorder, memory difficulty ST   Surgical History: '03 breast cancer mastectomy bilateral (cancer free per pt)and cholecystectomy  Medications: reviewed in chart and intake form  Allergies: side effect of meds see EMR x15 listed     Precautions: OP   STEADI Fall Risk: 8 (score of 4+ indicates fall risk)       SUBJECTIVE:   Pt is a 72 yo female sent to PT secondary to hx of falls (2x in last month)first time tripped, second time was bent at waist stood up and fell over, post RT knee are shoulder were sore-hx O/A RT and LT shoulders and hips-\"bone one bone\".  Pt had vertigo 6 weeks no longer having any spinning, or light headiness.  Pt has visual-depth perception issues-prisms in glasses for double vision-since January-pt not sure why.  Pt reports non malignant cyst over LT ear.  Pt lives alone.    Imaging:   Impression   1. No evidence of acute infarct, intracranial mass effect or midline  shift.  2. 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  3. There is overall mild age-related generalized cerebral volume loss.  4. Mild degree of nonspecific white matter signal compatible with  microangiopathy        Pain:   Current: 0-8/10 RT knee with extending her knee too much    Previous " Interventions: none recent       Red flags: none    Occupation: retired  Hobbies: garden, walks 3-4x no assistive device   Home Living: ranch, 2 steps to enter home, performs step over step uses hand rail   Prior Level of Function: x1 mo increased balance loss     Patient/Family Goal:prevent falls     Outcome Measures:   Sit to stand test x5- sec-22.73 sec used bilateral UE support   ABC-615/16-38.4% confidence    OBJECTIVE:    Cognition: pt with good recall of hx, pt unsure of her age did guess correctly, but did know her birthday   Posture: forward head    Gait: mildly wider increased base of support, no assistive device see TUG below   Functional Mobility:+UE support  TUG sec 17.97 fall risk,  norm 11 sec  Joint Mobility: I with sit to stand    Balance:firm hands cross chest   DLS-min sway no LOB, 60 sec with eyes open, eyes closed 60 mod sway   Stagger 30 sec max sway no LOB  Tandem stance 1 sec LOB  SLB unable     Foam:  DLS Max sway     LE ROM: WFL tight hip rotators can fig 4 stretch with UE support with difficulty LT tighter    LE STRENGTH:some weakness noted with sit to stand not MMT with OP hx.  Pt at least 3/5 with leg extension, hip flexion antigravity and heel and toe raise   Vestibular:  -Eye tracking horiz/vertical with no nystagmus noted  or c/o dizziness smooth pursuits   Treatments:  Therapeutic Exercise: (1 minutes)   X5 sit to stand trial of no hands, needed arms     Manual Therapy: ( minutes)    Gait Training: ( minutes)    Neuromuscular Re-education: (8 minutes)  Marching x10 ea leg  180 deg turns slight transient dizziness with change in direction firm hands cross chest   DLS-min sway no LOB, 60 sec with eyes open, eyes closed 60 mod sway   Stagger 30 sec max sway no LOB  Tandem stance 1 sec LOB  SLB unable hands to 1 hand     Foam:  DLS  30 sec max sway   Walking 10 feet and turning    Therapeutic Activity: (4 minutes)  Fall risk prevention education reviewed with handout , use hand rail with  stairs. How to get up from ground if she falls.    Eval findings and rx plan    OP Education: as above      HEP:perform in corner and chair in front   Access Code: [email or web address]  URL: [email or web address]  Date: 06/24/2024  Prepared by: Edith Oconnor    Exercises  - Romberg Stance  - 1 x daily - 7 x weekly - 30 hold eyes open   - Standing Romberg to 1/2 Tandem Stance  - 1 x daily - 7 x weekly - 30 hold    Response to treatment:transient dizziness reported with turning that did not last    ASSESSMENT:   Pt is a 72 yo female sent to PT secondary to hx of falls, 2 recent. Pt with hx of vertigo denies currently. Pt presents with decreased balance with NBOS, unstable surfaces, turning, pt is a fall risk per STEADI-scored 8.  Pt has difficulty getting up from floor and reports LE weakness.  Outcome Measures:   Sit to stand test x5- sec-22.73 sec used bilateral UE support   ABC-615/16-38.4% confidence   Co morbidities:Relevant Past Medical History:per EMR Back pain, HTN, GERD, OP-takes Fosamax,O/A B shoulders, RT leg/hip pain,LT hip pain, mm tear RT knee,pre diabetic,depressive disorder, memory difficulty ST   Surgical History: '03 breast cancer mastectomy bilateral (cancer free per pt)and cholecystectomy   Pt could benefit from PT to address above impairments and increase function and decrease fall risk  PLAN:  Saccades, VOR assessment balance sameer, LE core strengthening avoid end range flexion of spine hx of OP  OP PT PLAN:  Treatment/Interventions: Education/Instruction , Gait training , Neuromuscular re-education , Self care/home management , Therapeutic activities , and Therapeutic exercise    PT Plan: Skilled PT   PT Frequency: 2 times per week   Duration:6  Certification Period Start Date:   Certification Period End Date:   Visits Approved: [place]  Rehab Potential: Good  Plan of Care Agreement: Patient         Goals:   STG 6  Pt will improve LE strength to sit to stand to no hands required.    Pt will report decreased  falls.  Pt will be able to walk room distance and carry items without LOB  Pt will improve LE strength to perform stairs step over step with minimal need of UE support for balance     LTG 12   Outcome Measures: will improve  Sit to stand test x5- sec-22.73 sec used bilateral UE support to 20 sec   ABC-615/16-38.4% confidence to 50% confident   Pt will be able to SLB for 10 sec indicating improved balance, fall prevention

## 2024-07-02 ENCOUNTER — APPOINTMENT (OUTPATIENT)
Dept: PRIMARY CARE | Facility: CLINIC | Age: 74
End: 2024-07-02
Payer: MEDICARE

## 2024-07-05 DIAGNOSIS — K21.00 GASTROESOPHAGEAL REFLUX DISEASE WITH ESOPHAGITIS WITHOUT HEMORRHAGE: ICD-10-CM

## 2024-07-05 DIAGNOSIS — I10 ESSENTIAL HYPERTENSION: ICD-10-CM

## 2024-07-05 RX ORDER — CLONIDINE HYDROCHLORIDE 0.1 MG/1
0.1 TABLET ORAL DAILY
Qty: 90 TABLET | Refills: 1 | Status: SHIPPED | OUTPATIENT
Start: 2024-07-05

## 2024-07-05 RX ORDER — OMEPRAZOLE 20 MG/1
20 CAPSULE, DELAYED RELEASE ORAL
Qty: 90 CAPSULE | Refills: 3 | Status: SHIPPED | OUTPATIENT
Start: 2024-07-05

## 2024-07-05 RX ORDER — SPIRONOLACTONE 25 MG/1
25 TABLET ORAL DAILY
Qty: 90 TABLET | Refills: 1 | Status: SHIPPED | OUTPATIENT
Start: 2024-07-05

## 2024-07-09 ENCOUNTER — TREATMENT (OUTPATIENT)
Dept: PHYSICAL THERAPY | Facility: CLINIC | Age: 74
End: 2024-07-09
Payer: MEDICARE

## 2024-07-09 DIAGNOSIS — R26.89 BALANCE DISORDER: ICD-10-CM

## 2024-07-09 PROCEDURE — 97112 NEUROMUSCULAR REEDUCATION: CPT | Mod: GP,CQ

## 2024-07-09 PROCEDURE — 97110 THERAPEUTIC EXERCISES: CPT | Mod: GP,CQ

## 2024-07-09 NOTE — PROGRESS NOTES
Physical Therapy    Physical Therapy Treatment    Patient Name: Mackenzie Baker  MRN: [id number]  Today's Date: 7/9/2024    Time Calculation  Start Time: 0930  Stop Time: 1015  Time Calculation (min): 45 min    Visit #: 2 out of 12  Insurance:   Evaluation date: 6-24-24      Current Problem:   1. Balance disorder  Follow Up In Physical Therapy          SUBJECTIVE:   Denies falls since last visit, Performing HEP as instructed. Currently denies any sx's      Precautions: OP   STEADI Fall Risk: 8 (score of 4+ indicates fall risk)        Pain:   Start of session: 0/10       OBJECTIVE:    See eval    Treatments:  Therapeutic Exercise: (20 minutes)   Seated :  LAQ 10 x 2 L/R  Isometric hip adduction 10 x 2 (with ball)  Sit to stand from chair 10 x 1 with UE/ 10 x 1 without UE   Standing heel raises 10 x 2  Side stepping 3 laps    Manual Therapy: ( minutes)    Gait Training: ( minutes)    Neuromuscular Re-education: (25 minutes)  NBOS EO slow head turns 1 x 10  NBOS EC  practice   Half tandem stance without UE practice   Low gladys step overs (1) forward 10 x 1 L/R one hand support  Low gladys step overs sideways (1) 10 x 1 L/R without UE support     Therapeutic Activity: ( minutes)       HEP:perform in corner and chair in front   Access Code: [email or web address]  URL: [email or web address]  Date: 06/24/2024  Prepared by: Edith Oconnor     Exercises  - Romberg Stance  - 1 x daily - 7 x weekly - 30 hold eyes open   - Standing Romberg to 1/2 Tandem Stance  - 1 x daily - 7 x weekly - 30 hold    ASSESSMENT:   Able to progress Dynamic balance activity this session with close supervision, Pt requires postural cues and redirecting to remain on task    Post session pain:    denies  PLAN:  Saccades, VOR assessment balance sameer, LE core strengthening avoid end range flexion of spine hx of OP  OP PT PLAN:  Treatment/Interventions: Education/Instruction , Gait training , Neuromuscular re-education , Self care/home management , Therapeutic  activities , and Therapeutic exercise    PT Plan: Skilled PT   PT Frequency: 2 times per week   Duration:6  Certification Period Start Date:   Certification Period End Date:   Visits Approved: [place]  Rehab Potential: Good  Plan of Care Agreement: Patient         Goals:   STG 6  Pt will improve LE strength to sit to stand to no hands required.    Pt will report decreased falls.  Pt will be able to walk room distance and carry items without LOB  Pt will improve LE strength to perform stairs step over step with minimal need of UE support for balance      LTG 12   Outcome Measures: will improve  Sit to stand test x5- sec-22.73 sec used bilateral UE support to 20 sec   ABC-615/16-38.4% confidence to 50% confident   Pt will be able to SLB for 10 sec indicating improved balance, fall prevention

## 2024-07-11 ENCOUNTER — TREATMENT (OUTPATIENT)
Dept: PHYSICAL THERAPY | Facility: CLINIC | Age: 74
End: 2024-07-11
Payer: MEDICARE

## 2024-07-11 DIAGNOSIS — R26.89 BALANCE DISORDER: ICD-10-CM

## 2024-07-11 PROCEDURE — 97110 THERAPEUTIC EXERCISES: CPT | Mod: GP | Performed by: PHYSICAL THERAPIST

## 2024-07-11 PROCEDURE — 97112 NEUROMUSCULAR REEDUCATION: CPT | Mod: GP | Performed by: PHYSICAL THERAPIST

## 2024-07-11 NOTE — PROGRESS NOTES
Physical Therapy    Physical Therapy Treatment    Patient Name: Mackenzie Baker  MRN: [id number]  Today's Date: 7/11/2024    Time Calculation  Start Time: 1215  Stop Time: 1255  Time Calculation (min): 40 min    Visit #: 2 out of 12  Insurance:   Evaluation date: 6-24-24      Current Problem:   1. Balance disorder  Follow Up In Physical Therapy          SUBJECTIVE:   No pain now at night can have some pain in right leg, move around and improves  Denies falls since last visit, Performing HEP as instructed. Currently denies any sx's      Precautions: OP   Relevant Past Medical History:per EMR Back pain, HTN, GERD, OP-takes Fosamax,O/A B shoulders, RT leg/hip pain,LT hip pain, mm tear RT knee,pre diabetic,depressive disorder, [facility] (score of 4+ indicates fall risk)        Pain:   Start of session: 0/10       OBJECTIVE:    Saccades no nystagmus or dizziness  deferred further VOR with neck pain issues   98 pulse 02, HR 82 post standing activity  Treatments:  Therapeutic Exercise: (20 minutes)   Seated :  LAQ 13 x 2 L/R RT reduced range pain   Isometric hip adduction 12 x 2 (with ball)  Sit to stand from chair  10 x 2 without UE   Standing heel raises 12 x 2  Step tap holding rail light  bilateral  2x10 2 inch   At rail Side stepping 3 laps transient light headed went away    Manual Therapy: ( minutes)    Gait Training: ( minutes)    Neuromuscular Re-education: (20 minutes)  NBOS DLS EO slow head turns 1 x 10  NBOS DLS  1EC  30 sec ea , then both with SBA  Half tandem stance without UE practice   Low gladys step overs and back (1) forward 12 x 1 L/R one hand support  Low gladys step overs sideways (1) 10 x 1 L/R with 1 UE support     Therapeutic Activity: ( minutes)       HEP:perform in corner and chair in front   Access Code: [email or web address]  URL: [email or web address]  Date: 06/24/2024  Prepared by: Edith Oconnor     Exercises  - Romberg Stance  - 1 x daily - 7 x weekly - 30 hold  eyes open   - Standing Romberg to 1/2 Tandem Stance  - 1 x daily - 7 x weekly - 30 hold    ASSESSMENT:   Pt has poor NBOS balance.    Short rest breaks required for pt as some mild shortness of breath at times. HR and pulse 02 looked normal   Progressing with activities to reach her goals  Post session pain:    Denies increased pain, fatigued   PLAN:  Walk and carry to work on balance   If pt finds herself SOB more often with activities she could do prior she should reach out to her MD  Saccades, VOR assessment balance sameer, LE core strengthening avoid end range flexion of spine hx of OP  OP PT PLAN:  Treatment/Interventions: Education/Instruction , Gait training , Neuromuscular re-education , Self care/home management , Therapeutic activities , and Therapeutic exercise    PT Plan: Skilled PT   PT Frequency: 2 times per week   Duration:6  Certification Period Start Date:   Certification Period End Date:   Visits Approved: [place]  Rehab Potential: Good  Plan of Care Agreement: Patient         Goals:   STG 6  Pt will improve LE strength to sit to stand to no hands required.    Pt will report decreased falls.  Pt will be able to walk room distance and carry items without LOB  Pt will improve LE strength to perform stairs step over step with minimal need of UE support for balance      LTG 12   Outcome Measures: will improve  Sit to stand test x5- sec-22.73 sec used bilateral UE support to 20 sec   ABC-615/16-38.4% confidence to 50% confident   Pt will be able to SLB for 10 sec indicating improved balance, fall prevention

## 2024-07-16 ENCOUNTER — TREATMENT (OUTPATIENT)
Dept: PHYSICAL THERAPY | Facility: CLINIC | Age: 74
End: 2024-07-16
Payer: MEDICARE

## 2024-07-16 DIAGNOSIS — R26.89 BALANCE DISORDER: ICD-10-CM

## 2024-07-16 PROCEDURE — 97112 NEUROMUSCULAR REEDUCATION: CPT | Mod: GP,CQ

## 2024-07-16 PROCEDURE — 97110 THERAPEUTIC EXERCISES: CPT | Mod: GP,CQ

## 2024-07-16 NOTE — PROGRESS NOTES
Physical Therapy    Physical Therapy Treatment    Patient Name: Mackenzie Baker  MRN: [id number]  Today's Date: 7/16/2024    Time Calculation  Start Time: 1105  Stop Time: 1150  Time Calculation (min): 45 min    Visit #: 4 out of 12  Insurance:   Evaluation date: 6-24-24      Current Problem:   1. Balance disorder  Follow Up In Physical Therapy          SUBJECTIVE:   Denies pain, reports having an incident over  the weekend walking home from neighbors house tried stepping over large rock, lost balance but did not fall.      Precautions: OP   Relevant Past Medical History:per EMR Back pain, HTN, GERD, OP-takes Fosamax,O/A B shoulders, RT leg/hip pain,LT hip pain, mm tear RT knee,pre diabetic,depressive disorder, memory difficulty ST   [facility] (score of 4+ indicates fall risk)        Pain:   Start of session: 0/10       OBJECTIVE:    Saccades no nystagmus or dizziness  deferred further VOR with neck pain issues   98 pulse 02, HR 83 post standing activity  Treatments:  Therapeutic Exercise: (25 minutes)   Seated :  LAQ 2 x 15 L/R RT reduced range pain   Isometric hip adduction 12 x 2 (with ball)  Seated hip flexion (march) 2 x 15  Sit to stand from chair  10 x 2 without UE   Standing heel raises 1 x 15, toe raises 1 x 15    Manual Therapy: ( minutes)    Gait Training: ( minutes)    Neuromuscular Re-education: (20 minutes)  Half tandem stance without UE practice   Low gladys step overs and back (1) forward 12 x 1 L/R one hand support  Low gladys step overs sideways (1) 10 x 1 L/R with 1 UE support  Low gladys step over 3 in a row with SA support working on slow pace/stability before moving on to next gladys  Toe taps to  4 inch step reciprocal  x 10   , cues for breath awareness  SLS practice at counter top 2 sec L/R       Therapeutic Activity: ( minutes)       HEP:perform in corner and chair in front   Access Code: [email or web address]  URL: [email or web address]  Date: 06/24/2024  Prepared by: Edith Oconnor      Exercises  - Romberg Stance  - 1 x daily - 7 x weekly - 30 hold eyes open   - Standing Romberg to 1/2 Tandem Stance  - 1 x daily - 7 x weekly - 30 hold    ASSESSMENT:   Monitored heart rate and SPO2 levels throughout session which remained in normal ranges for activity level. Cues needed to focus on breath, pt tends to hold breath with focused concentration on activity . Cues to increase ASHKAN when performing dynamic balance activities to improve stability    Post session pain:    Denies increased pain, fatigued   PLAN:  Walk and carry to work on balance   If pt finds herself SOB more often with activities she could do prior she should reach out to her MD  Saccades, VOR assessment balance sameer, LE core strengthening avoid end range flexion of spine hx of OP  OP PT PLAN:  Treatment/Interventions: Education/Instruction , Gait training , Neuromuscular re-education , Self care/home management , Therapeutic activities , and Therapeutic exercise    PT Plan: Skilled PT   PT Frequency: 2 times per week   Duration:6  Certification Period Start Date:   Certification Period End Date:   Visits Approved: [place]  Rehab Potential: Good  Plan of Care Agreement: Patient         Goals:   STG 6  Pt will improve LE strength to sit to stand to no hands required.    Pt will report decreased falls.  Pt will be able to walk room distance and carry items without LOB  Pt will improve LE strength to perform stairs step over step with minimal need of UE support for balance      LTG 12   Outcome Measures: will improve  Sit to stand test x5- sec-22.73 sec used bilateral UE support to 20 sec   ABC-615/16-38.4% confidence to 50% confident   Pt will be able to SLB for 10 sec indicating improved balance, fall prevention

## 2024-07-18 ENCOUNTER — TREATMENT (OUTPATIENT)
Dept: PHYSICAL THERAPY | Facility: CLINIC | Age: 74
End: 2024-07-18
Payer: MEDICARE

## 2024-07-18 DIAGNOSIS — R26.89 BALANCE DISORDER: ICD-10-CM

## 2024-07-18 PROCEDURE — 97110 THERAPEUTIC EXERCISES: CPT | Mod: GP | Performed by: PHYSICAL THERAPIST

## 2024-07-18 NOTE — PROGRESS NOTES
Physical Therapy    Physical Therapy Treatment    Patient Name: Mackenzie Baker  MRN: [id number]  Today's Date: 7/18/2024         Visit #: 5 out of 12  Insurance:   Evaluation date: 6-24-24      Current Problem:   1. Balance disorder  Follow Up In Physical Therapy          SUBJECTIVE:   OK after last session, no recent falls       Precautions: OP   Relevant Past Medical History:per EMR Back pain, HTN, GERD, OP-takes Fosamax,O/A B shoulders, RT leg/hip pain,LT hip pain, mm tear RT knee,pre diabetic,depressive disorder, memory difficulty ST   [facility] Fall Risk: 8 (score of 4+ indicates fall risk)        Pain:   Start of session: 0/10       OBJECTIVE:    Saccades no nystagmus or dizziness  deferred further VOR with neck pain issues   97 pulse 02, HR 77-83 post standing activity  Treatments:  Therapeutic Exercise: (20 minutes)   Seated :  LAQ 2 x 10/1# L/R RT reduced range pain   Isometric hip adduction 13 x 2 (with ball)  Sit to stand from chair  12 x 2 without UE   Standing heel raises 2x10     Manual Therapy: ( minutes)    Gait Training: ( minutes)    Neuromuscular Re-education: (20 minutes)  Firm-Half tandem stance without UE SBA 20 sec  Low gladys step overs and back (1) forward 12 x 1 L/R one hand support light  Low gladys step overs sideways (1) 12 x 1 L/R with 1 UE support  Low gladys step over 2 in a row with SA support working on slow pace/stability before moving on to next gladys x5  Toe taps to  4 inch step reciprocal  x 10   , cues for breath awareness  SLS  2 trials L/R with UE piano on bar   Side step along table x6 no hands     Therapeutic Activity: ( minutes)       HEP:perform in corner and chair in front   Access Code: [email or web address]  URL: [email or web address]  Date: 06/24/2024  Prepared by: Edith Oconnor     Exercises  - Romberg Stance  - 1 x daily - 7 x weekly - 30 hold eyes open   - Standing Romberg to 1/2 Tandem Stance  - 1 x daily - 7 x weekly - 30 hold  Access Code: [email or web address]  URL:  [email or web address]  Date: 07/18/2024  Prepared by: Edith Oconnor    Exercises  - Seated Long Arc Quad  - 1 x daily - 7 x weekly - 3 sets - 10 reps  - Seated Isometric Hip Adduction with Ball  - 3 x weekly - 2 sets - 10 reps  - Seated Hip Abduction with Resistance  - 1 x daily - 7 x weekly - 3 sets - 10 reps  ASSESSMENT:   Monitored heart rate and SPO2 levels throughout session which remained in normal ranges for activity level. Cues needed to focus on breath, pt tends to hold breath with focused concentration on activity . Cues to increase ASHKAN when performing dynamic balance activities to improve stability    Post session pain:    Denies increased pain, fatigued   PLAN:  Unstable surface   Walk and carry to work on balance   If pt finds herself SOB more often with activities she could do prior she should reach out to her MD Dunn, VOR assessment balance sameer, LE core strengthening avoid end range flexion of spine hx of OP  OP PT PLAN:  Treatment/Interventions: Education/Instruction , Gait training , Neuromuscular re-education , Self care/home management , Therapeutic activities , and Therapeutic exercise    PT Plan: Skilled PT   PT Frequency: 2 times per week   Duration:6  Certification Period Start Date:   Certification Period End Date:   Visits Approved: [place]  Rehab Potential: Good  Plan of Care Agreement: Patient         Goals:   STG 6  Pt will improve LE strength to sit to stand to no hands required.    Pt will report decreased falls.  Pt will be able to walk room distance and carry items without LOB  Pt will improve LE strength to perform stairs step over step with minimal need of UE support for balance      LTG 12   Outcome Measures: will improve  Sit to stand test x5- sec-22.73 sec used bilateral UE support to 20 sec   ABC-615/16-38.4% confidence to 50% confident   Pt will be able to SLB for 10 sec indicating improved balance, fall prevention

## 2024-07-23 ENCOUNTER — TREATMENT (OUTPATIENT)
Dept: PHYSICAL THERAPY | Facility: CLINIC | Age: 74
End: 2024-07-23
Payer: MEDICARE

## 2024-07-23 DIAGNOSIS — R26.89 BALANCE DISORDER: ICD-10-CM

## 2024-07-23 PROCEDURE — 97110 THERAPEUTIC EXERCISES: CPT | Mod: GP,CQ

## 2024-07-23 PROCEDURE — 97530 THERAPEUTIC ACTIVITIES: CPT | Mod: GP,CQ

## 2024-07-23 NOTE — PROGRESS NOTES
Physical Therapy    Physical Therapy Treatment    Patient Name: Mackenzie Baker  MRN: [id number]  Today's Date: 7/23/2024    Time Calculation  Start Time: 1102  Stop Time: 1145  Time Calculation (min): 43 min    Visit #:  6 out of 12  Insurance:   Evaluation date: 6-24-24      Current Problem:   1. Balance disorder  Follow Up In Physical Therapy          SUBJECTIVE:   OK after last session, no recent falls       Precautions: OP   Relevant Past Medical History:per EMR Back pain, HTN, GERD, OP-takes Fosamax,O/A B shoulders, RT leg/hip pain,LT hip pain, mm tear RT knee,pre diabetic,depressive disorder, memory difficulty [facility] Fall Risk: 8 (score of 4+ indicates fall risk)        Pain:   Start of session: 0/10       OBJECTIVE:    Able to SLS 1-2 sec left and right leg   HR 77- 92, SPO2 98-99%  post standing activity  Treatments:  Therapeutic Exercise: (20 minutes)   Seated :  LAQ 2 x 15/1# L/R RT reduced range pain   Reciprocal  march 1# 3 x 10  Sit to stand from chair  12 x 2 without UE   Standing heel raises 1 x 10 firm , 2 x 10 foam without UE  4 inch step up with SA support 1 x 10 L/R    Manual Therapy: ( minutes)    Gait Training: ( minutes)    Neuromuscular Re-education: (20 minutes)  Firm-Half tandem stance with  ball toss hand to hand , SBA switching lead leg 10 sec x 2 each   Low gladys step overs and back (1) forward 10 x 1 L/R one hand support light  Low gladys step overs sideways (1) 10 x 1 L/R with 1 UE support  Toe taps to  4 inch step reciprocal  x 10   , cues for breath awareness  Sit to stand to walk with basket hold DA at chest with approx 2# wt 15 feet x 2  Walking small toss and catch blue ball to self 15 feet x 2      Therapeutic Activity: ( minutes)       HEP:perform in corner and chair in front   Access Code: [email or web address]  URL: [email or web address]  Date: 06/24/2024  Prepared by: Edith Oconnor     Exercises  - Romberg Stance  - 1 x daily - 7 x weekly - 30 hold eyes open   - Standing Romberg  to 1/2 Tandem Stance  - 1 x daily - 7 x weekly - 30 hold  Access Code: [email or web address]  URL: [email or web address]  Date: 07/18/2024  Prepared by: Edith Oconnor    Exercises  - Seated Long Arc Quad  - 1 x daily - 7 x weekly - 3 sets - 10 reps  - Seated Isometric Hip Adduction with Ball  - 3 x weekly - 2 sets - 10 reps  - Seated Hip Abduction with Resistance  - 1 x daily - 7 x weekly - 3 sets - 10 reps  ASSESSMENT:   Progressed dynamic balance activities to walk holding variety of objects in hand without loss of balance, Pt demonstrates improved awareness of increasing ASHKAN with standing balance to become more steady and improve safety   Monitored heart rate and SPO2 levels throughout session which remained in normal ranges for activity level. Cues needed to focus on breath    Post session pain:    Denies increased pain, fatigued   PLAN:  Unstable surface   Walk and carry to work on balance   If pt finds herself SOB more often with activities she could do prior she should reach out to her MD  Saccades, VOR assessment balance sameer, LE core strengthening avoid end range flexion of spine hx of OP  OP PT PLAN:  Treatment/Interventions: Education/Instruction , Gait training , Neuromuscular re-education , Self care/home management , Therapeutic activities , and Therapeutic exercise    PT Plan: Skilled PT   PT Frequency: 2 times per week   Duration:6  Certification Period Start Date:   Certification Period End Date:   Visits Approved: [place]  Rehab Potential: Good  Plan of Care Agreement: Patient         Goals:   STG 6  Pt will improve LE strength to sit to stand to no hands required.    Pt will report decreased falls.  Pt will be able to walk room distance and carry items without LOB  Pt will improve LE strength to perform stairs step over step with minimal need of UE support for balance      LTG 12   Outcome Measures: will improve  Sit to stand test x5- sec-22.73 sec used bilateral UE support to 20 sec   ABC-615/16-38.4%  confidence to 50% confident   Pt will be able to SLB for 10 sec indicating improved balance, fall prevention

## 2024-07-25 ENCOUNTER — TREATMENT (OUTPATIENT)
Dept: PHYSICAL THERAPY | Facility: CLINIC | Age: 74
End: 2024-07-25
Payer: MEDICARE

## 2024-07-25 DIAGNOSIS — R26.89 BALANCE DISORDER: ICD-10-CM

## 2024-07-25 PROCEDURE — 97110 THERAPEUTIC EXERCISES: CPT | Mod: GP | Performed by: PHYSICAL THERAPIST

## 2024-07-25 PROCEDURE — 97112 NEUROMUSCULAR REEDUCATION: CPT | Mod: GP | Performed by: PHYSICAL THERAPIST

## 2024-07-25 NOTE — PROGRESS NOTES
Physical Therapy    Physical Therapy Treatment AND [facility]    Patient Name: Mackenzie Baker  MRN: [id number]  Today's Date: 7/25/2024    Time Calculation  Start Time: 1130  Stop Time: 1210  Time Calculation (min): 40 min  2nd cert sent today 7/25/24-10/23/24  First cert sent 6/24-9/2 cert sent , signed 6/24/24    Visit #:  7 out of 12  Insurance:   Evaluation date: 6-24-24      Current Problem:   1. Balance disorder  Follow Up In Physical Therapy          SUBJECTIVE:   OK after last session, no recent falls   Pt is seeing progress with her stamina for walking, not as comfortable to walk not as tight and pulling as previous    Hard to walk and carry grocery bags    Precautions: OP   Relevant Past Medical History:per EMR Back pain, HTN, GERD, OP-takes Fosamax,O/A B shoulders, RT leg/hip pain,LT hip pain, mm tear RT knee,pre diabetic,depressive disorder, memory difficulty ST   STEADI Fall Risk: 8 (score of 4+ indicates fall risk)        Pain:   Start of session: 0/10       OBJECTIVE:    TUG test:15.5 sec   Able to SLS 1-2 sec left and right leg  Pre starting 98 pulse 02, 78 HR    HR 75- 88, SPO2 98-%  post standing activity  Treatments:  Therapeutic Exercise: (20 minutes)   Seated :  LAQ 2 x 10/2# L/R RT reduced range pain   Reciprocal  march 1# 3 x 10  DNP  Hip add isometrics x15 ball   Sit to stand from chair  13 x 2 without UE   Standing heel raises 1 x 10 firm , 2 x 10 foam without UE  4 inch step up with SA support 1 x 12 L/R    Manual Therapy: ( minutes)    Gait Training: ( minutes)    Neuromuscular Re-education: (20 minutes)  TUG   Firm-Half tandem stance 30 sec   Low gladys step overs and back (1) forward 12 x 1 L/R one hand support light  Low gladys step overs sideways (1) 12 x 1 L/R with 1 UE support  Toe taps to  4 inch step reciprocal  x 10   , cues to breathe   Sit to stand to walk with basket hold DA at chest with approx 2# wt 20 feet x 2  Standing on foam DLS EO feet apart 30 sec, wt shifts front to  back and side side x15  Standing march some migrating noted   Therapeutic Activity: ( minutes)    HEP:perform in corner and chair in front   Access Code: [email or web address]  URL: [email or web address]  Date: 06/24/2024  Prepared by: Edith Oconnor     Exercises  - Romberg Stance  - 1 x daily - 7 x weekly - 30 hold eyes open   - Standing Romberg to 1/2 Tandem Stance  - 1 x daily - 7 x weekly - 30 hold  Access Code: [email or web address]  URL: [email or web address]  Date: 07/18/2024  Prepared by: Edith Oconnor    Exercises  - Seated Long Arc Quad  - 1 x daily - 7 x weekly - 3 sets - 10 reps  - Seated Isometric Hip Adduction with Ball  - 3 x weekly - 2 sets - 10 reps  - Seated Hip Abduction with Resistance  - 1 x daily - 7 x weekly - 3 sets - 10 reps  ASSESSMENT:   Scores with TUG test at 15 sec put pt at risk for falls(>12 sec)   Pt demonstrates improved awareness of increasing ASHKAN with standing balance to become more steady and improve safety   Monitored heart rate and SPO2 levels throughout session which remained in normal ranges for activity level. Cues needed to focus on breathing needed   Added unstable surface walking to help pt with navigating uneven terrain.    Progress with goals see below   Pt is benefiting from PT walking better and still has deficits to improve   Post session pain:    Denies increased pain, fatigued   PLAN:  Consider trial SLB, walk and change speeds  with close S   Cont PT established in POC to 12 sessions    balance sameer, LE core strengthening avoid end range flexion of spine hx of OP  OP PT PLAN:  Treatment/Interventions: Education/Instruction , Gait training , Neuromuscular re-education , Self care/home management , Therapeutic activities , and Therapeutic exercise    PT Plan: Skilled PT   PT Frequency: 2 times per week   Duration:6  Certification Period Start Date:   Certification Period End Date:   Visits Approved: MN  Rehab Potential: Good  Plan of Care Agreement: Patient         Goals:   STG  6  Pt will improve LE strength to sit to stand to no hands required>>A     Pt will report decreased falls>>>A  Pt will be able to walk room distance and carry items without LOB>>>A  Pt will improve LE strength to perform stairs step over step with minimal need of UE support for balance>>pt still feels challenged using rail, first step requires one step ast time and then does step over step>>progressing      LTG 12   Outcome Measures: will improve  Sit to stand test x5- sec-22.73 sec used bilateral UE support to 20 sec >>progressing  ABC-615/16-38.4% confidence to 50% confident >>>progressing  Pt will be able to SLB for 10 sec indicating improved balance, fall prevention >>>progressing

## 2024-07-30 ENCOUNTER — TREATMENT (OUTPATIENT)
Dept: PHYSICAL THERAPY | Facility: CLINIC | Age: 74
End: 2024-07-30
Payer: MEDICARE

## 2024-07-30 DIAGNOSIS — R26.89 BALANCE DISORDER: ICD-10-CM

## 2024-07-30 PROCEDURE — 97110 THERAPEUTIC EXERCISES: CPT | Mod: GP | Performed by: PHYSICAL THERAPIST

## 2024-07-30 PROCEDURE — 97112 NEUROMUSCULAR REEDUCATION: CPT | Mod: GP | Performed by: PHYSICAL THERAPIST

## 2024-07-30 NOTE — PROGRESS NOTES
Physical Therapy    Physical Therapy Treatment AND [facility]    Patient Name: Mackenzie Baker  MRN: [id number]  Today's Date: 7/30/2024    Time Calculation  Start Time: 1342  Stop Time: 1420  Time Calculation (min): 38 min  2nd cert sent today 7/25/24-10/23/24  First cert sent 6/24-9/2 cert sent , signed 6/24/24    Visit #:  7 out of 12  Insurance:   Evaluation date: 6-24-24      Current Problem:   1. Balance disorder  Follow Up In Physical Therapy          SUBJECTIVE:   OK after last session, no recent falls   Pt is seeing progress with her stamina for walking   Hard to walk and carry grocery bags    Precautions: OP   Relevant Past Medical History:per EMR Back pain, HTN, GERD, OP-takes Fosamax,O/A B shoulders, RT leg/hip pain,LT hip pain, mm tear RT knee,pre diabetic,depressive disorder, memory difficulty ST   [facility] (score of 4+ indicates fall risk)        Pain:   Start of session: 0/10       OBJECTIVE:    No LOB with backward step    TUG test:15.5 sec   Able to SLS 1-2 sec left and right leg  Pre starting 97-98 pulse 02, 76 HR    HR 80- 84, SPO2 98-%  post standing activity  Treatments:  Therapeutic Exercise: (18 minutes)   Seated :  LAQ 2 x 12+/2# L/R RT reduced range pain   Reciprocal  march 1# 3 x 10  DNP  Hip add isometrics x2x10 ball   Sit to stand from chair  14 x 2 without UE   Standing heel raises 2x10 no UE support   4 inch step up with SA support 1 x 13 L/R    Manual Therapy: ( minutes)    Gait Training: ( minutes)    Neuromuscular Re-education: (20 minutes)  Walking changing speeds faster slower and stopping freeze  Firm-Half tandem stance 30 sec and one eye covered   Low gladys step overs and back (1) forward 12 x 1 L/R no hand support caught RT foot a few times   Low gladys step overs sideways (1) 12 x 1 L/R no support  Toe taps to  4 inch step reciprocal  x 15   , cues to breathe   Sit to stand to walk with basket hold DA at chest with approx 2# wt 20 feet x 2 DNP  Standing on foam  DLS EO/EC feet apart 30 sec, wt shifts front to back and side side x15  Standing march in place  2x10   Backward step   Side step along table no hands 6 feet x 6    Therapeutic Activity: ( minutes)    HEP:perform in corner and chair in front   Access Code: [email or web address]  URL: [email or web address]  Date: 06/24/2024  Prepared by: Edith Oconnor     Exercises  - Romberg Stance  - 1 x daily - 7 x weekly - 30 hold eyes open   - Standing Romberg to 1/2 Tandem Stance  - 1 x daily - 7 x weekly - 30 hold  Access Code: [email or web address]  URL: [email or web address]  Date: 07/18/2024  Prepared by: Edith Oconnor    Exercises  - Seated Long Arc Quad  - 1 x daily - 7 x weekly - 3 sets - 10 reps  - Seated Isometric Hip Adduction with Ball  - 3 x weekly - 2 sets - 10 reps  - Seated Hip Abduction with Resistance  - 1 x daily - 7 x weekly - 3 sets - 10 reps  ASSESSMENT:   Scores with TUG test at 15 sec put pt at risk for falls(>12 sec)   Pt demonstrates improved awareness of increasing ASHKAN with standing balance to become more steady and improve safety   Monitored heart rate and SPO2 levels throughout session which remained in normal ranges for activity level. Cues needed to focus on breathing needed   Added unstable surface walking to help pt with navigating uneven terrain.    Progress with goals see below   Pt is benefiting from PT walking better and still has deficits to improve   Post session pain:    Denies increased pain, fatigued   PLAN:  Consider trial SLB,  Cont PT established in POC to 12 sessions    balance sameer, LE core strengthening avoid end range flexion of spine hx of OP  OP PT PLAN:  Treatment/Interventions: Education/Instruction , Gait training , Neuromuscular re-education , Self care/home management , Therapeutic activities , and Therapeutic exercise    PT Plan: Skilled PT   PT Frequency: 2 times per week   Duration:6  Certification Period Start Date:   Certification Period End Date:   Visits Approved: [place]  Rehab  Potential: Good  Plan of Care Agreement: Patient         Goals:   STG 6  Pt will improve LE strength to sit to stand to no hands required>>A     Pt will report decreased falls>>>A  Pt will be able to walk room distance and carry items without LOB>>>A  Pt will improve LE strength to perform stairs step over step with minimal need of UE support for balance>>pt still feels challenged using rail, first step requires one step ast time and then does step over step>>progressing      LTG 12   Outcome Measures: will improve  Sit to stand test x5- sec-22.73 sec used bilateral UE support to 20 sec >>progressing  ABC-615/16-38.4% confidence to 50% confident >>>progressing  Pt will be able to SLB for 10 sec indicating improved balance, fall prevention >>>progressing

## 2024-08-01 ENCOUNTER — TREATMENT (OUTPATIENT)
Dept: PHYSICAL THERAPY | Facility: CLINIC | Age: 74
End: 2024-08-01
Payer: MEDICARE

## 2024-08-01 DIAGNOSIS — R26.89 BALANCE DISORDER: ICD-10-CM

## 2024-08-01 PROCEDURE — 97112 NEUROMUSCULAR REEDUCATION: CPT | Mod: GP | Performed by: PHYSICAL THERAPIST

## 2024-08-01 PROCEDURE — 97110 THERAPEUTIC EXERCISES: CPT | Mod: GP | Performed by: PHYSICAL THERAPIST

## 2024-08-01 NOTE — PROGRESS NOTES
Physical Therapy    Physical Therapy Treatment AND [facility]    Patient Name: Mackenzie Baker  MRN: [id number]  Today's Date: 8/1/2024    Time Calculation  Start Time: 1128  Stop Time: 1210  Time Calculation (min): 42 min  2nd cert sent today 7/25/24-10/23/24  First cert sent 6/24-9/2 cert sent , signed 6/24/24    Visit #:  8 out of 12  Insurance:   Evaluation date: 6-24-24      Current Problem:   1. Balance disorder  Follow Up In Physical Therapy          SUBJECTIVE:   OK after last session, no recent falls   Pt is seeing progress with her stamina for walking   Hard to walk and carry grocery bags    Precautions: OP   Relevant Past Medical History:per EMR Back pain, HTN, GERD, OP-takes Fosamax,O/A B shoulders, RT leg/hip pain,LT hip pain, mm tear RT knee,pre diabetic,depressive disorder, memory difficulty ST   [facility] (score of 4+ indicates fall risk)        Pain:   Start of session: 0/10       OBJECTIVE:    LOB with eyes closed     TUG test:15.5 sec   Able to SLS 1-2 sec left and right leg  Pre starting 97-98 pulse 02, 84 HR    HR  84-90, SPO2 98-99%  post standing activity  Treatments:  Therapeutic Exercise: (20 minutes)   Seated :  LAQ 2 x 14 2# L/R RT reduced range pain   Reciprocal  march 1# 3 x 10  DNP  Hip add isometrics x2x12 ball   Sit to stand from chair  15 x 2 without UE   Standing heel raises 2x12 no UE support   4 inch step up with SA support no UE support 1 x 12 L/R    Manual Therapy: ( minutes)    Gait Training: ( minutes)    Neuromuscular Re-education: (22 minutes)  Walking changing speeds faster slower and stopping freeze  Firm-Half tandem stance 30 sec and one eye covered-some LOB   Low gladys step overs and back (1) forward 12 x 1 L/R no hands  Low gladys step overs sideways (1) 12 x 1 L/R no hands  Toe taps to  4 inch step reciprocal  x 15    Sit to stand to walk with basket hold DA at chest with approx 2# wt 20 feet x 2 DNP  Standing on foam DLS EO/EC feet apart 30 sec, wt shifts  front to back and side side x15  Standing march in place  2x12   Backward step x10 feet x2   Side step along table no hands 6 feet x 6    Therapeutic Activity: ( minutes)    HEP:perform in corner and chair in front   Access Code: [email or web address]  URL: [email or web address]  Date: 06/24/2024  Prepared by: Edith Oconnor     Exercises  - Romberg Stance  - 1 x daily - 7 x weekly - 30 hold eyes open   - Standing Romberg to 1/2 Tandem Stance  - 1 x daily - 7 x weekly - 30 hold  Access Code: [email or web address]  URL: [email or web address]  Date: 07/18/2024  Prepared by: Edith Oconnor    Exercises  - Seated Long Arc Quad  - 1 x daily - 7 x weekly - 3 sets - 10 reps  - Seated Isometric Hip Adduction with Ball  - 3 x weekly - 2 sets - 10 reps  - Seated Hip Abduction with Resistance  - 1 x daily - 7 x weekly - 3 sets - 10 reps  ASSESSMENT:   Monitored heart rate and SPO2 levels throughout session which remained in normal ranges for activity level. Cues needed to focus on breathing needed    Progress with goals see below   Pt did well with changing walking speeds and turning 180 no LOB  Pt is benefiting from PT walking better and still has deficits to improve   Post session pain:    Denies increased pain, fatigued   PLAN:  Consider trial SLB,update HEP handouts  Cont PT established in POC to 12 sessions    balance sameer, LE core strengthening avoid end range flexion of spine hx of OP  OP PT PLAN:  Treatment/Interventions: Education/Instruction , Gait training , Neuromuscular re-education , Self care/home management , Therapeutic activities , and Therapeutic exercise    PT Plan: Skilled PT   PT Frequency: 2 times per week   Duration:6  Certification Period Start Date:   Certification Period End Date:   Visits Approved: [place]  Rehab Potential: Good  Plan of Care Agreement: Patient         Goals:   STG 6  Pt will improve LE strength to sit to stand to no hands required>>A     Pt will report decreased falls>>>A  Pt will be able to walk room  distance and carry items without LOB>>>A  Pt will improve LE strength to perform stairs step over step with minimal need of UE support for balance>>pt still feels challenged using rail, first step requires one step ast time and then does step over step>>progressing      LTG 12   Outcome Measures: will improve  Sit to stand test x5- sec-22.73 sec used bilateral UE support to 20 sec >>progressing  ABC-615/16-38.4% confidence to 50% confident >>>progressing  Pt will be able to SLB for 10 sec indicating improved balance, fall prevention >>>progressing

## 2024-08-05 DIAGNOSIS — I10 BENIGN ESSENTIAL HYPERTENSION: ICD-10-CM

## 2024-08-05 RX ORDER — AMLODIPINE BESYLATE 5 MG/1
5 TABLET ORAL DAILY
Qty: 90 TABLET | Refills: 1 | Status: SHIPPED | OUTPATIENT
Start: 2024-08-05

## 2024-08-06 ENCOUNTER — TREATMENT (OUTPATIENT)
Dept: PHYSICAL THERAPY | Facility: CLINIC | Age: 74
End: 2024-08-06
Payer: MEDICARE

## 2024-08-06 DIAGNOSIS — R26.89 BALANCE DISORDER: ICD-10-CM

## 2024-08-06 PROCEDURE — 97110 THERAPEUTIC EXERCISES: CPT | Mod: GP | Performed by: PHYSICAL THERAPIST

## 2024-08-06 PROCEDURE — 97112 NEUROMUSCULAR REEDUCATION: CPT | Mod: GP | Performed by: PHYSICAL THERAPIST

## 2024-08-06 NOTE — PROGRESS NOTES
Physical Therapy    Physical Therapy Treatment AND [facility]    Patient Name: Mackenzie Baker  MRN: [id number]  Today's Date: 8/6/2024    Time Calculation  Start Time: 1255  Stop Time: 1338  Time Calculation (min): 43 min  2nd cert sent today 7/25/24-10/23/24  First cert sent 6/24-9/2 cert sent , signed 6/24/24    Visit #:  10 out of 12  Insurance: 2nd cert sent 7/25/24- 10/23/24MEDICARE A/B   Evaluation date: 6-24-24      Current Problem:   1. Balance disorder  Follow Up In Physical Therapy          SUBJECTIVE:   OK after last session, no recent falls, no pain  Pt is seeing progress with her stamina for walking,If humid under 10min   Hard to walk and carry grocery bags   C/o back legs feel tired    Precautions: OP   Relevant Past Medical History:per EMR Back pain, HTN, GERD, OP-takes Fosamax,O/A B shoulders, RT leg/hip pain,LT hip pain, mm tear RT knee,pre diabetic,depressive disorder, memory difficulty ST   [facility] (score of 4+ indicates fall risk)        Pain:   Start of session: 0/10       OBJECTIVE:    LOB with eyes closed     TUG test:15.5 sec   Able to SLS 1-2 sec left and right leg  Pre starting 97-98 pulse 02, 84 HR    HR  84-91, SPO2 96-98-%  post standing activity  Treatments:  Therapeutic Exercise: (20 minutes)   Seated :  LAQ 2 x 15 2# L/R RT reduced range pain   Reciprocal  march 1# 3 x 10  DNP  Hip add isometrics x2x14 ball   Sit to stand from chair  15 x 2 without UE   Standing heel raises 2x14 no UE support   4 inch step up with SA support no UE support 1 x 13 L/R  Seated HS stretch with lordosis x2 ea  Ham curls x10 ea rom to comfort       Gait Training: ( minutes)    Neuromuscular Re-education: (23 minutes)  Walking changing speeds faster slower and stopping freeze DNP  Firm-Half tandem stance 30 sec and one eye covered-  Low gladys step overs forward x3, x4 trips  Low gladys step overs sideways x2 trips  Toe taps to 6 inch step reciprocal  2x10  Sit to stand to walk with basket hold  DA at chest with approx 2# wt 20 feet x 2 DNP  Standing on foam DLS EO wt shifts front to back and side side x20, DLS feet apart EC 30 sec   Standing march in place  2x15  Backward step x10 feet x2   Walk straight line with feet close to heel toe 10 feet x2   Side step along table no hands 6 feet x 6    Therapeutic Activity: ( minutes)    HEP:perform in corner and chair in front   Access Code: [email or web address]  URL: [email or web address]  Date: 06/24/2024  Prepared by: Edith Oconnor     Exercises  - Romberg Stance  - 1 x daily - 7 x weekly - 30 hold eyes open   - Standing Romberg to 1/2 Tandem Stance  - 1 x daily - 7 x weekly - 30 hold  Access Code: [email or web address]  URL: [email or web address]  Date: 07/18/2024  Prepared by: Edith Oconnor    Exercises  - Seated Long Arc Quad  - 1 x daily - 7 x weekly - 3 sets - 10 reps  - Seated Isometric Hip Adduction with Ball  - 3 x weekly - 2 sets - 10 reps  - Seated Hip Abduction with Resistance  - 1 x daily - 7 x weekly - 3 sets - 10 reps  ASSESSMENT:   Monitored heart rate and SPO2 levels throughout session which remained in normal ranges for activity level. Cues needed to focus on breathing needed    Progress with goals see below   Pt did better with half tandem standing today   Pt is benefiting from PT walking better and still has deficits to improve   Post session pain:    Denies increased pain, fatigued   PLAN:  Consider trial SLB  Cont PT established in POC to 12 sessions    balance sameer, LE core strengthening avoid end range flexion of spine hx of OP  OP PT PLAN:  Treatment/Interventions: Education/Instruction , Gait training , Neuromuscular re-education , Self care/home management , Therapeutic activities , and Therapeutic exercise    PT Plan: Skilled PT   PT Frequency: 2 times per week   Duration:6  Certification Period Start Date:   Certification Period End Date:   Visits Approved: [place]  Rehab Potential: Good  Plan of Care Agreement: Patient         Goals:   STG 6  Pt will  improve LE strength to sit to stand to no hands required>>A     Pt will report decreased falls>>>A  Pt will be able to walk room distance and carry items without LOB>>>A  Pt will improve LE strength to perform stairs step over step with minimal need of UE support for balance>>pt still feels challenged using rail, first step requires one step ast time and then does step over step>>progressing      LTG 12   Outcome Measures: will improve  Sit to stand test x5- sec-22.73 sec used bilateral UE support to 20 sec >>progressing  ABC-615/16-38.4% confidence to 50% confident >>>progressing  Pt will be able to SLB for 10 sec indicating improved balance, fall prevention >>>progressing

## 2024-08-19 ENCOUNTER — APPOINTMENT (OUTPATIENT)
Dept: PHYSICAL THERAPY | Facility: CLINIC | Age: 74
End: 2024-08-19
Payer: MEDICARE

## 2024-08-22 ENCOUNTER — TREATMENT (OUTPATIENT)
Dept: PHYSICAL THERAPY | Facility: CLINIC | Age: 74
End: 2024-08-22
Payer: MEDICARE

## 2024-08-22 DIAGNOSIS — R26.89 BALANCE DISORDER: ICD-10-CM

## 2024-08-22 PROCEDURE — 97110 THERAPEUTIC EXERCISES: CPT | Mod: GP | Performed by: PHYSICAL THERAPIST

## 2024-08-22 PROCEDURE — 97112 NEUROMUSCULAR REEDUCATION: CPT | Mod: GP | Performed by: PHYSICAL THERAPIST

## 2024-08-22 NOTE — PROGRESS NOTES
Physical Therapy    Physical Therapy Treatment AND [facility]    Patient Name: Mackenzie Baker  MRN: [id number]  Today's Date: 8/22/2024    Time Calculation  Start Time: 1216  Stop Time: 1256  Time Calculation (min): 40 min  2nd cert sent today 7/25/24-10/23/24  First cert sent 6/24-9/2 cert sent , signed 6/24/24    Visit #:  11 out of 12  Insurance: 2nd cert sent 7/25/24- 10/23/24MEDICARE A/B   Evaluation date: 6-24-24      Current Problem:   1. Balance disorder  Follow Up In Physical Therapy          SUBJECTIVE:   OK after last session, no recent falls, no pain  Pt is seeing progress with her stamina for walking, more stable walking on uneven ground    Hard to walk and carry grocery bags and do stairs      Precautions: OP   Relevant Past Medical History:per EMR Back pain, HTN, GERD, OP-takes Fosamax,O/A B shoulders, RT leg/hip pain,LT hip pain, mm tear RT knee,pre diabetic,depressive disorder, memory difficulty [facility] Fall Risk: 8 (score of 4+ indicates fall risk)        Pain:   Start of session: 0/10       OBJECTIVE:    LOB with eyes closed   Past   TUG test:15.5 sec   Able to SLS 1-2 sec left and right leg  Pre starting 97-98 pulse 02, 84 HR    HR  84-91, SPO2 96-98-%  post standing activity  Treatments:  Therapeutic Exercise: (20 minutes)   Seated :  LAQ 3x10 2# L/R RT reduced range pain   Reciprocal  march 1# 3 x 10  DNP  Hip add isometrics x 2x15 ball   Sit to stand from chair  15 x 2 without UE     Standing heel raises 2x15 no UE support   4 inch step up  no UE support 1 x 10 L/R  Seated HS stretch with lordosis x2 ea 10 sec   Ham curls x10 ea rom to comfort       Gait Training: ( minutes)    Neuromuscular Re-education: (20 minutes)  Walking changing speeds faster slower and stopping freeze DNP  Firm-Half tandem stance 30 sec and one eye covered-  Low gladys step overs forward x3, x4 trips  Low gladys step overs side ways x3, x4 trips  Toe taps to 6 inch step reciprocal  2x10  Sit to stand to walk with  basket hold DA at chest with approx 2# wt 20 feet x 2 DNP  Standing on foam DLS EO wt shifts front to back and side side x20, DLS feet apart EC 30 sec , marching no hands   Backward step x10 feet x2   Walk straight line with feet close to heel toe 10 feet x2   Side step along table no hands 6 feet x 6  SLB piano on bar 3x ea leg   Therapeutic Activity: ( minutes)    HEP:perform in corner and chair in front   Access Code: [email or web address]  URL: [email or web address]  Date: 06/24/2024  Prepared by: Edith Oconnor     Exercises  - Romberg Stance  - 1 x daily - 7 x weekly - 30 hold eyes open   - Standing Romberg to 1/2 Tandem Stance  - 1 x daily - 7 x weekly - 30 hold  Access Code: [email or web address]  URL: [email or web address]  Date: 07/18/2024  Prepared by: Edith Oconnor    Exercises  - Seated Long Arc Quad  - 1 x daily - 7 x weekly - 3 sets - 10 reps  - Seated Isometric Hip Adduction with Ball  - 3 x weekly - 2 sets - 10 reps  - Seated Hip Abduction with Resistance  - 1 x daily - 7 x weekly - 3 sets - 10 reps  ASSESSMENT:   Monitored heart rate and SPO2 levels throughout session which remained in normal ranges for activity level. Cues needed to focus on breathing needed    Progress with goals see below   Pt still needs better balance carrying items and with stairs, she is improving   Pt did better with side stepping with hurdles today not holding   Pt is benefiting from PT walking better and still has deficits to improve   Post session pain:    Denies increased pain, fatigued   PLAN:  Reeval next session, pt missed session last week , pt indicating she still wants to continue seeing benefits   Sit to stand test, tug, rescore ABC  Cont PT established in POC to 12 sessions    balance sameer, LE core strengthening avoid end range flexion of spine hx of OP  OP PT PLAN:  Treatment/Interventions: Education/Instruction , Gait training , Neuromuscular re-education , Self care/home management , Therapeutic activities , and Therapeutic  exercise    PT Plan: Skilled PT   PT Frequency: 2 times per week   Duration:6  Certification Period Start Date:   Certification Period End Date:   Visits Approved: [place]  Rehab Potential: Good  Plan of Care Agreement: Patient         Goals:   STG 6  Pt will improve LE strength to sit to stand to no hands required>>A     Pt will report decreased falls>>>A  Pt will be able to walk room distance and carry items without LOB>>>A  Pt will improve LE strength to perform stairs step over step with minimal need of UE support for balance>>pt still feels challenged using rail, first step requires one step ast time and then does step over step>>progressing      LTG 12   Outcome Measures: will improve  Sit to stand test x5- sec-22.73 sec used bilateral UE support to 20 sec >>progressing  ABC-615/16-38.4% confidence to 50% confident >>>progressing  Pt will be able to SLB for 10 sec indicating improved balance, fall prevention >>>progressing

## 2024-09-04 ENCOUNTER — TREATMENT (OUTPATIENT)
Dept: PHYSICAL THERAPY | Facility: CLINIC | Age: 74
End: 2024-09-04
Payer: MEDICARE

## 2024-09-04 DIAGNOSIS — R26.89 BALANCE DISORDER: ICD-10-CM

## 2024-09-04 PROCEDURE — 97110 THERAPEUTIC EXERCISES: CPT | Mod: GP | Performed by: PHYSICAL THERAPIST

## 2024-09-04 PROCEDURE — 97112 NEUROMUSCULAR REEDUCATION: CPT | Mod: GP | Performed by: PHYSICAL THERAPIST

## 2024-09-04 NOTE — PROGRESS NOTES
Physical Therapy    Physical Therapy Treatment and DISCHARGE    Patient Name: Mackenzie Baker  MRN: [id number]  Today's Date: 9/4/2024    Time Calculation  Start Time: 1045  Stop Time: 1127  Time Calculation (min): 42 min  2nd cert sent today 7/25/24-10/23/24  First cert sent 6/24-9/2 cert sent , signed 6/24/24    Visit #:  12 out of 12  Insurance: 2nd cert sent 7/25/24- 10/23/24MEDICARE A/B   Evaluation date: 6-24-24      Current Problem:   1. Balance disorder  Follow Up In Physical Therapy          SUBJECTIVE:   OK after last session, no recent falls, no pain  Pt is seeing progress with her stamina for walking, more stable walking on uneven ground    Hard to walk and carry grocery bags and do stairs      Precautions: OP   Relevant Past Medical History:per EMR Back pain, HTN, GERD, OP-takes Fosamax,O/A B shoulders, RT leg/hip pain,LT hip pain, mm tear RT knee,pre diabetic,depressive disorder, memory difficulty [facility] Fall Risk: 8 (score of 4+ indicates fall risk)        Pain:   Start of session: 0/10 shoulders comes goes        OBJECTIVE:    LOB with eyes closed     TUG test:15.5 sec last time, today 14.10  5x Sit to stand test:12.14 sec no arms at eval 22.73 sec   ABC-64% confident , 38.4%     Able to SLS 1-2 sec left and right leg  Pre starting 98-99 pulse 02, 84 HR , during 97-98 , 85 HR   HR  84-91, SPO2 96-98-%  post standing activity  Treatments:  Therapeutic Exercise: (20 minutes)  Sit stand x5  Walk 10 feet and turn quickly      Seated :  LAQ 2x10 2.5# L/R RT reduced range pain   Reciprocal  march 1# 3 x 10  DNP  Hip add isometrics x 2x15 ball     Standing heel raises 2x15 no UE support   6  inch step up  no UE support 1 x 10 L/R  Seated HS stretch with lordosis x2 ea 20 sec   Ham curls 2x10 ea rom to comfort       Gait Training: ( minutes)    Neuromuscular Re-education: (20 minutes)  Firm-Half tandem stance 30 sec and one eye covered-  Toe taps to 6 inch step reciprocal  2x10  Standing on foam DLS EO wt  shifts front to back and side side x20, DLS feet together both EC 30 sec , marching no hands   Stagger stance and count to 10 and backward   Backward step x10 feet x2   Walk straight line with feet close to heel toe 10 feet x2   Side step along table no hands 6 feet x 6  SLB piano on bar 3x ea leg   Review of balance sameer pt can do in corner for her hep     Therapeutic Activity: ( minutes)    HEP:perform in corner and chair in front   Access Code: [email or web address]  URL: [email or web address]  Date: 06/24/2024  Prepared by: Edith Oconnor     Exercises  - Romberg Stance  - 1 x daily - 7 x weekly - 30 hold eyes open   - Standing Romberg to 1/2 Tandem Stance  - 1 x daily - 7 x weekly - 30 hold  Heel toe walk UE support ea side   Can add a eye closed all in corner and chair in front tandem half stance   Access Code: [email or web address]  URL: [email or web address]  Date: 07/18/2024  Prepared by: Edith Oconnor    Exercises  - Seated Long Arc Quad  - 1 x daily - 7 x weekly - 3 sets - 10 reps  - Seated Isometric Hip Adduction with Ball  - 3 x weekly - 2 sets - 10 reps  - Seated Hip Abduction with Resistance  - 1 x daily - 7 x weekly - 3 sets - 10 reps  Heel raises with UE support 2x15 3 days a week  Sit to stand 2x10    ASSESSMENT:   Monitored heart rate and SPO2 levels throughout session which remained in normal ranges for activity level. Cues needed to focus on breathing needed    Pt made great improvements in TUG and sit stand scores as well as confidence ABC.  Progress with goals see below,pt has decreased her fall risk.    Pt still has poor NBOS-single leg stance  Pt has achieved most goals set   Post session pain:    Denies increased pain, fatigued   PLAN:  Discharge  Pt understands her HEP and can continue at home             Goals:   STG 6  Pt will improve LE strength to sit to stand to no hands required>>A     Pt will report decreased falls>>>A  Pt will be able to walk room distance and carry items without LOB>>>A  Pt will  improve LE strength to perform stairs step over step with minimal need of UE support for balance>>pt still feels challenged using rail, first step requires one step ast time and then does step over step>>progressing      LTG 12   Outcome Measures: will improve  Sit to stand test x5- sec-22.73 sec used bilateral UE support to 20 sec >>met  ABC-615/16-38.4% confidence to 50% confident >>>met 64%  Pt will be able to SLB for 10 sec indicating improved balance, fall prevention >>>not met

## 2024-10-15 ENCOUNTER — APPOINTMENT (OUTPATIENT)
Dept: PRIMARY CARE | Facility: CLINIC | Age: 74
End: 2024-10-15
Payer: MEDICARE

## 2024-10-15 NOTE — ASSESSMENT & PLAN NOTE
A1C= 5.5% today  No medications     Orders:    POCT glycosylated hemoglobin (Hb A1C) manually resulted    Comprehensive Metabolic Panel; Future    Albumin-Creatinine Ratio, Urine Random; Future

## 2024-10-15 NOTE — PROGRESS NOTES
Subjective   Reason for Visit: Mackenzie Baker is an 74 y.o. female here for a Medicare Wellness visit.     Past Medical, Surgical, and Family History reviewed and updated in chart.    Reviewed all medications by prescribing practitioner or clinical pharmacist (such as prescriptions, OTCs, herbal therapies and supplements) and documented in the medical record.    HPI  Mackenzie is an est 75 yo female presenting today for AWV     Dx: OSTEOPOROSIS, HTN, ANXIETY/DEPRESSION, LE EDEMA, GERD, CHRONIC SHOULDER PAIN, ECZEMA, HFpEF     Pt saw neurology last year, Sept 2023 for ? Memory issues/concerns  No concerns per neuro, pt not taking any medications     Pt denies any acute c/o today    #PRE-DIABETES  5.8% March 2023---> 5.5% today      #OSTEOPOROSIS  DEXA= 2021---> ordered today   taking Fosamax weekly on Saturday   BMI= 27  exercise: not currently      #HTN  Seeing cardiologySTEVE  taking Amlodipine, Clonidine, Spironolactone, Lasix   BP today= 114/79  CT cardiac scoring= 111 May 2022     #GERD  Taking Omeprazole 20 mg  sx: no concerns     #ANXIETY/DEPRESSION  Taking Wellbutrin 300 mg daily  mood: good   sleep: good      #OA RIGHT SHOULDER   Xray Oct 2021  Taking Tramadol PRN; states she takes 1 pill at bedtime every few weeks     #HM  FBW: DUE  MAMM: hx of breast cancer w/bilateral mastectomy    DEXA: 2021  COLON: 2018---> pt refuses further CRC screening  VACCINES: UTD    Allergies   Allergen Reactions    Penicillin V Hives    Chlorthalidone Other     nausea, vomiting    Gabapentin Other     Loss of muscle control    Hydrochlorothiazide Other     confusion/loss of memory    Hydrocodone-Acetaminophen Other    Lisinopril Other    Lorazepam Other     Angry, agressiveness    Losartan Other     brain felt scrambled    Metformin Other and Unknown    Metoprolol Other     dizzy, faint, and scrambled my brain    Penicillin Hives    Penicillin G Hives    Penicillins Hives    Propoxyphene Hives and Other       Patient  "Care Team:  KARRIE Bush as PCP - General (Family Medicine)  KARRIE Bush as PCP - MSS[facility] ACO Attributed Provider       Review of Systems  ROS was completed and all systems are negative with the exception of what was noted in the the HPI.       Objective   Vitals:  /79   Pulse 86   Ht 1.6 m (5' 3\")   Wt 69.4 kg (153 lb)   SpO2 95%   BMI 27.10 kg/m²       Current Outpatient Medications   Medication Instructions    alendronate (FOSAMAX) 70 mg, oral, Every 7 days    ALPRAZolam (Xanax) 0.25 mg tablet 1 tablet    ALPRAZolam (Xanax) 1 mg tablet 1 tablet, 4 times daily PRN    amLODIPine (NORVASC) 5 mg, oral, Daily    benztropine (Cogentin) 1 mg tablet 3 times daily PRN    bisacodyl (Dulcolax) 5 mg EC tablet See admin instructions    buPROPion XL (WELLBUTRIN XL) 300 mg, oral, Daily    calcium carbonate-vitamin D3 (Oscal-500) 500 mg-10 mcg (400 unit) tablet 1 tablet, 2 times daily    clonazePAM (KLONOPIN) 1 mg, Daily RT    cloNIDine (CATAPRES) 0.1 mg, oral, Daily    cromolyn (Opticrom) 4 % ophthalmic solution 1 drop, Nightly    diclofenac sodium 2 g, Topical, Every 6 hours PRN    furosemide (LASIX) 40 mg, oral, Daily    ketorolac (Acular) 0.5 % ophthalmic solution 1 drop, Every 12 hours PRN    multivitamin tablet 1 tablet, Daily RT    OLANZapine (ZYPREXA) 10 mg, oral, Nightly    omeprazole (PRILOSEC) 20 mg, oral, Daily before breakfast    rOPINIRole (Requip) 0.5 mg tablet 1 tablet, Nightly PRN    spironolactone (ALDACTONE) 25 mg, oral, Daily    traMADol (ULTRAM) 50 mg, oral, Daily PRN    triamcinolone (Kenalog) 0.1 % cream 2 times daily    valACYclovir (Valtrex) 1 gram tablet 1 tablet, 3 times daily     Office Visit on 10/16/2024   Component Date Value Ref Range Status    POC HEMOGLOBIN A1c 10/16/2024 5.5  4.2 - 6.5 % Final         Physical Exam  Vitals reviewed.   Cardiovascular:      Pulses: Normal pulses.      Heart sounds: Normal heart sounds.   Pulmonary:      Effort: Pulmonary effort " is normal.      Breath sounds: Normal breath sounds.   Skin:     Comments: B/L MASTECTOMY    Neurological:      Mental Status: She is alert and oriented to person, place, and time.   Psychiatric:         Mood and Affect: Mood normal.           Assessment & Plan  Medicare annual wellness visit, subsequent  - Counseled on healthy diet and regular exercise  - Fall avoidance information provided  - Personalized prevention plan provided   - Vaccines UTD   - FBW ordered   - DEXA ordered   - Mammogram deferred d/t B/L mastectomy          Osteoporosis without current pathological fracture, unspecified osteoporosis type  Schedule DEXA        Benign essential hypertension  Stable today 114/79  Continue Amlodipine, Clonidine, Spironolactone, Lasix  FBW ordered        Prediabetes  A1C= 5.5% today  No medications     Orders:    POCT glycosylated hemoglobin (Hb A1C) manually resulted    Comprehensive Metabolic Panel; Future    Albumin-Creatinine Ratio, Urine Random; Future    Advanced directives, counseling/discussion  I spent > 16 minutes face to face with Mackenzie Mahere discussing his/her advance directives, including a Living Will, Healthcare POA as well as specific end of life choices and/or directives, and pt was provided with packet to return next visit.    HCPOA: Farideh   Pt is Full Code         Screening for multiple conditions  Depression screening completed using PHQ-2 questions with results documented in the chart/encounter (15 minutes)  See rooming screening section for documentation and/or progress note for additional information.         Asymptomatic menopausal state    Orders:    XR DEXA bone density; Future    Routine general medical examination at health care facility    Orders:    1 Year Follow Up In Primary Care - Wellness Exam; Future    Lipid screening    Orders:    Lipid Panel; Future            Patient was identified as a fall risk. Risk prevention instructions provided.

## 2024-10-16 ENCOUNTER — APPOINTMENT (OUTPATIENT)
Dept: PRIMARY CARE | Facility: CLINIC | Age: 74
End: 2024-10-16
Payer: MEDICARE

## 2024-10-16 VITALS
HEART RATE: 86 BPM | BODY MASS INDEX: 27.11 KG/M2 | HEIGHT: 63 IN | SYSTOLIC BLOOD PRESSURE: 114 MMHG | OXYGEN SATURATION: 95 % | WEIGHT: 153 LBS | DIASTOLIC BLOOD PRESSURE: 79 MMHG

## 2024-10-16 DIAGNOSIS — Z00.00 MEDICARE ANNUAL WELLNESS VISIT, SUBSEQUENT: Primary | ICD-10-CM

## 2024-10-16 DIAGNOSIS — R73.03 PREDIABETES: ICD-10-CM

## 2024-10-16 DIAGNOSIS — Z00.00 ROUTINE GENERAL MEDICAL EXAMINATION AT HEALTH CARE FACILITY: ICD-10-CM

## 2024-10-16 DIAGNOSIS — Z78.0 ASYMPTOMATIC MENOPAUSAL STATE: ICD-10-CM

## 2024-10-16 DIAGNOSIS — Z13.220 LIPID SCREENING: ICD-10-CM

## 2024-10-16 DIAGNOSIS — M81.0 OSTEOPOROSIS WITHOUT CURRENT PATHOLOGICAL FRACTURE, UNSPECIFIED OSTEOPOROSIS TYPE: ICD-10-CM

## 2024-10-16 DIAGNOSIS — I10 BENIGN ESSENTIAL HYPERTENSION: ICD-10-CM

## 2024-10-16 DIAGNOSIS — Z13.89 SCREENING FOR MULTIPLE CONDITIONS: ICD-10-CM

## 2024-10-16 DIAGNOSIS — Z71.89 ADVANCED DIRECTIVES, COUNSELING/DISCUSSION: ICD-10-CM

## 2024-10-16 LAB — POC HEMOGLOBIN A1C: 5.5 % (ref 4.2–6.5)

## 2024-10-16 ASSESSMENT — ACTIVITIES OF DAILY LIVING (ADL)
MANAGING_FINANCES: INDEPENDENT
DOING_HOUSEWORK: INDEPENDENT
GROCERY_SHOPPING: INDEPENDENT
DRESSING: INDEPENDENT
TAKING_MEDICATION: INDEPENDENT
BATHING: INDEPENDENT

## 2024-10-16 NOTE — ASSESSMENT & PLAN NOTE
- Counseled on healthy diet and regular exercise  - Fall avoidance information provided  - Personalized prevention plan provided   - Vaccines UTD   - FBW ordered   - DEXA ordered   - Mammogram deferred d/t B/L mastectomy

## 2024-10-16 NOTE — ASSESSMENT & PLAN NOTE
I spent > 16 minutes face to face with Mackenzie Baker discussing his/her advance directives, including a Living Will, Healthcare POA as well as specific end of life choices and/or directives, and pt was provided with packet to return next visit.    HCPOA: Farideh   Pt is Full Code

## 2024-10-16 NOTE — PATIENT INSTRUCTIONS
Thank you for seeing me today Mackenzie Mahere, it was a pleasure to see you again!    Today we did your Annual Medicare Wellness Exam and discussed the following:     Continue medications as prescribed    Schedule DEXA       For assistance with scheduling referrals or consultations, please call [phone] or [phone].    For laboratory, radiology, and other tests, please call [phone] ([phone] for pediatrics).   If you do not get results within 7-10 days, or you have any questions or concerns, please send a message, call the office ([phone]), or return to the office for a follow-up appointment.     For acute/sick visits, if you are unable to get an office visit, you can do a  On Demand Virtual Visit that is accessible via your My Chart account.  For emergencies, call 9-1-1 or go to the nearest Emergency Department.     Please schedule additional appointment(s) to address concern(s) not addressed today.    Please review prescription inserts and published information for possible adverse effects of all medications.     In general, results are discussed over the phone or via  [employer].     You can see your health information, review clinical summaries from office visits & test results online when you follow your health with MY  Chart, a personal health record.   To sign up go to [email or web address].   If you need assistance with signing up or trouble getting into your account call [employer] Patient Line 24/7 at [phone]         Ways to Help Prevent Falls at Home    Quick Tips   ? Ask for help if you need it. Most people want to help!   ? Get up slowly after sitting or laying down   ? Wear a medical alert device or keep cell phone in your pocket   ? Use night lights, especially areas near a bathroom   ? Keep the items you use often within reach on a small stool or end table   ? Use an assistive device such as walker or cane, as directed by provider/physical therapy   ? Use a non-slip  mat and grab bars in your bathroom. Look for home health sections for best options     Other Areas to Focus On   ? Exercise and nutrition: Regular exercise or taking a falls prevention class are great ways improve strength and balance. Don’t forget to stay hydrated and bring a snack!   ? Medicine side effects: Some medicines can make you sleepy or dizzy, which could cause a fall. Ask your healthcare provider about the side effects your medicines could cause. Be sure to let them know if you take any vitamins or supplements as well.   ? Tripping hazards: Remove items you could trip on, such as loose mats, rugs, cords, and clutter. Wear closed toe shoes with rubber soles.   ? Health and wellness: Get regular checkups with your healthcare provider, plus routine vision and hearing screenings. Talk with your healthcare provider about:   o Your medicines and the possible side effects - bring them in a bag if that is easier!   o Problems with balance or feeling dizzy   o Ways to promote bone health, such as Vitamin D and calcium supplements   o Questions or concerns about falling     *Ask your healthcare team if you have questions     [facility], 2022     RTC ANNUALLY AND AS NEEDED

## 2024-10-18 ENCOUNTER — LAB (OUTPATIENT)
Dept: LAB | Facility: LAB | Age: 74
End: 2024-10-18
Payer: MEDICARE

## 2024-10-18 DIAGNOSIS — R73.03 PREDIABETES: ICD-10-CM

## 2024-10-18 DIAGNOSIS — Z13.220 LIPID SCREENING: ICD-10-CM

## 2024-10-18 LAB
ALBUMIN SERPL BCP-MCNC: 4 G/DL (ref 3.4–5)
ALP SERPL-CCNC: 66 U/L (ref 33–136)
ALT SERPL W P-5'-P-CCNC: 14 U/L (ref 7–45)
ANION GAP SERPL CALCULATED.3IONS-SCNC: 13 MMOL/L (ref 10–20)
AST SERPL W P-5'-P-CCNC: 15 U/L (ref 9–39)
BILIRUB SERPL-MCNC: 0.8 MG/DL (ref 0–1.2)
BUN SERPL-MCNC: 12 MG/DL (ref 6–23)
CALCIUM SERPL-MCNC: 9.3 MG/DL (ref 8.6–10.3)
CHLORIDE SERPL-SCNC: 102 MMOL/L (ref 98–107)
CHOLEST SERPL-MCNC: 154 MG/DL (ref 0–199)
CHOLEST/HDLC SERPL: 1.9 {RATIO}
CO2 SERPL-SCNC: 25 MMOL/L (ref 21–32)
CREAT SERPL-MCNC: 0.91 MG/DL (ref 0.5–1.05)
EGFRCR SERPLBLD CKD-EPI 2021: 66 ML/MIN/1.73M*2
GLUCOSE SERPL-MCNC: 99 MG/DL (ref 74–99)
HDLC SERPL-MCNC: 79.5 MG/DL
LDLC SERPL CALC-MCNC: 62 MG/DL
NON HDL CHOLESTEROL: 75 MG/DL (ref 0–149)
POTASSIUM SERPL-SCNC: 4.4 MMOL/L (ref 3.5–5.3)
PROT SERPL-MCNC: 6.7 G/DL (ref 6.4–8.2)
SODIUM SERPL-SCNC: 136 MMOL/L (ref 136–145)
TRIGL SERPL-MCNC: 61 MG/DL (ref 0–149)
VLDL: 12 MG/DL (ref 0–40)

## 2024-10-18 PROCEDURE — 80061 LIPID PANEL: CPT

## 2024-10-18 PROCEDURE — 36415 COLL VENOUS BLD VENIPUNCTURE: CPT

## 2024-10-18 PROCEDURE — 80053 COMPREHEN METABOLIC PANEL: CPT

## 2024-10-19 NOTE — RESULT ENCOUNTER NOTE
Mackenzie Baker    I have reviewed all of your blood work and it looks fine.     Your kidney and liver function were normal.    Your cholesterol was amazing!    Keep up the good work!    If you have any questions, please feel free to call my office.    Take Care,   Cecy

## 2024-10-21 ENCOUNTER — HOSPITAL ENCOUNTER (OUTPATIENT)
Dept: RADIOLOGY | Facility: CLINIC | Age: 74
Discharge: HOME | End: 2024-10-21
Payer: MEDICARE

## 2024-10-21 DIAGNOSIS — Z78.0 ASYMPTOMATIC MENOPAUSAL STATE: ICD-10-CM

## 2024-10-21 PROCEDURE — 77080 DXA BONE DENSITY AXIAL: CPT

## 2024-10-21 PROCEDURE — 77080 DXA BONE DENSITY AXIAL: CPT | Performed by: RADIOLOGY

## 2024-10-23 ENCOUNTER — DOCUMENTATION (OUTPATIENT)
Dept: PHYSICAL THERAPY | Facility: CLINIC | Age: 74
End: 2024-10-23
Payer: MEDICARE

## 2024-10-23 NOTE — PROGRESS NOTES
Physical Therapy    Discharge Summary    Name: Mackenzie Baker  MRN: [id number]  : 1950  Date: 10/23/24    Discharge Summary: PT    Discharge Information: Date of discharge 24, Date of last visit 24, Date of evaluation 24, Number of attended visits , Referred by Maria Antonia, and Referred for balance disorder    Therapy Summary: pt was seen 12 sessions for balance, she reportedno recent falls, no pain  Pt reported stamina for walking, more stable walking on uneven ground     Discharge Status: see  note for specifics     Rehab Discharge Reason: Achieved all and/or the most significant goals(s)

## 2024-10-30 ENCOUNTER — APPOINTMENT (OUTPATIENT)
Dept: ORTHOPEDIC SURGERY | Facility: CLINIC | Age: 74
End: 2024-10-30
Payer: MEDICARE

## 2024-11-04 DIAGNOSIS — M25.512 CHRONIC PAIN OF BOTH SHOULDERS: ICD-10-CM

## 2024-11-04 DIAGNOSIS — I10 ESSENTIAL HYPERTENSION: ICD-10-CM

## 2024-11-04 DIAGNOSIS — M25.511 CHRONIC PAIN OF BOTH SHOULDERS: ICD-10-CM

## 2024-11-04 DIAGNOSIS — F11.90 OPIOID USE: Primary | ICD-10-CM

## 2024-11-04 DIAGNOSIS — G89.29 CHRONIC PAIN OF BOTH SHOULDERS: ICD-10-CM

## 2024-11-04 RX ORDER — FUROSEMIDE 20 MG/1
40 TABLET ORAL DAILY
Qty: 180 TABLET | Refills: 1 | Status: SHIPPED | OUTPATIENT
Start: 2024-11-04 | End: 2025-05-03

## 2024-11-04 RX ORDER — TRAMADOL HYDROCHLORIDE 50 MG/1
50 TABLET ORAL DAILY PRN
Qty: 10 TABLET | Refills: 0 | Status: SHIPPED | OUTPATIENT
Start: 2024-11-04

## 2024-11-04 RX ORDER — NALOXONE HYDROCHLORIDE 4 MG/.1ML
1 SPRAY NASAL AS NEEDED
Qty: 2 EACH | Refills: 0 | Status: SHIPPED | OUTPATIENT
Start: 2024-11-04

## 2024-11-05 DIAGNOSIS — M25.512 CHRONIC PAIN OF BOTH SHOULDERS: ICD-10-CM

## 2024-11-05 DIAGNOSIS — M25.511 CHRONIC PAIN OF BOTH SHOULDERS: ICD-10-CM

## 2024-11-05 DIAGNOSIS — G89.29 CHRONIC PAIN OF BOTH SHOULDERS: ICD-10-CM

## 2024-11-05 RX ORDER — DICLOFENAC SODIUM 10 MG/G
2 GEL TOPICAL 4 TIMES DAILY PRN
Qty: 100 G | Refills: 3 | Status: SHIPPED | OUTPATIENT
Start: 2024-11-05

## 2024-11-20 ENCOUNTER — APPOINTMENT (OUTPATIENT)
Dept: ORTHOPEDIC SURGERY | Facility: CLINIC | Age: 74
End: 2024-11-20
Payer: MEDICARE

## 2024-11-20 ENCOUNTER — HOSPITAL ENCOUNTER (OUTPATIENT)
Dept: RADIOLOGY | Facility: CLINIC | Age: 74
Discharge: HOME | End: 2024-11-20
Payer: MEDICARE

## 2024-11-20 DIAGNOSIS — M17.11 PRIMARY OSTEOARTHRITIS OF RIGHT KNEE: Primary | ICD-10-CM

## 2024-11-20 DIAGNOSIS — M16.0 BILATERAL PRIMARY OSTEOARTHRITIS OF HIP: ICD-10-CM

## 2024-11-20 DIAGNOSIS — M16.11 PRIMARY LOCALIZED OSTEOARTHRITIS OF RIGHT HIP: ICD-10-CM

## 2024-11-20 DIAGNOSIS — M25.561 RIGHT KNEE PAIN, UNSPECIFIED CHRONICITY: ICD-10-CM

## 2024-11-20 PROCEDURE — 1123F ACP DISCUSS/DSCN MKR DOCD: CPT | Performed by: STUDENT IN AN ORGANIZED HEALTH CARE EDUCATION/TRAINING PROGRAM

## 2024-11-20 PROCEDURE — 99214 OFFICE O/P EST MOD 30 MIN: CPT | Performed by: STUDENT IN AN ORGANIZED HEALTH CARE EDUCATION/TRAINING PROGRAM

## 2024-11-20 PROCEDURE — 73502 X-RAY EXAM HIP UNI 2-3 VIEWS: CPT | Mod: RIGHT SIDE | Performed by: RADIOLOGY

## 2024-11-20 PROCEDURE — 73564 X-RAY EXAM KNEE 4 OR MORE: CPT | Mod: RIGHT SIDE | Performed by: RADIOLOGY

## 2024-11-20 PROCEDURE — 1036F TOBACCO NON-USER: CPT | Performed by: STUDENT IN AN ORGANIZED HEALTH CARE EDUCATION/TRAINING PROGRAM

## 2024-11-20 PROCEDURE — 73502 X-RAY EXAM HIP UNI 2-3 VIEWS: CPT | Mod: RT

## 2024-11-20 PROCEDURE — 1159F MED LIST DOCD IN RCRD: CPT | Performed by: STUDENT IN AN ORGANIZED HEALTH CARE EDUCATION/TRAINING PROGRAM

## 2024-11-20 PROCEDURE — 73564 X-RAY EXAM KNEE 4 OR MORE: CPT | Mod: RT

## 2024-11-20 NOTE — PROGRESS NOTES
PRIMARY CARE PHYSICIAN: KOSTAS Bush-CNP  REFERRING PROVIDER: No referring provider defined for this encounter.       SUBJECTIVE  CHIEF COMPLAINT:   Chief Complaint   Patient presents with    Right Knee - New Patient Visit    Right Hip - New Patient Visit        HPI: Mackenzie Baker is a pleasant 74 y.o. year-old female who is seen today for follow-up evaluation of bilateral hip pain and right knee pain.  I last saw the patient in 2022.  At that time, she was referred to my partner for intra-articular hip injections.  The patient has advanced osteoarthritis of both hips.  It is most advanced in the left hip as compared to the right.  However, left hip is never been symptomatic for the patient.  The patient had a recent fall.  She was concerned that her osteoarthritis had progressed and that led to the fall.    Regarding her pain, she mostly has pain in the anterior thigh and the right lower extremity at night.  She has some mild pain in the left anterior thigh at night.  She does have worsening pain in the anterior aspect of the right knee over the past few months.  It responds well to movement and over-the-counter medications.  She does not have much in the way of arthur instability.  She has done physical therapy in the past for balance training.    REVIEW OF SYSTEMS  There has been no interval change in this patient's past medical, surgical, medications, allergies, family history or social history since the most recent visit to a provider within our department.  14 point review of systems was performed, reviewed, and negative except for pertinent positives documented in the history of present illness.    Past Medical History:   Diagnosis Date    Personal history of other (healed) physical injury and trauma 11/29/2014    History of sprain of knee    Shortness of breath 06/07/2022    SOB (shortness of breath) on exertion    Unspecified symptoms and signs involving the genitourinary system 06/22/2021    UTI  symptoms        Allergies   Allergen Reactions    Penicillin V Hives    Chlorthalidone Other     nausea, vomiting    Gabapentin Other     Loss of muscle control    Hydrochlorothiazide Other     confusion/loss of memory    Hydrocodone-Acetaminophen Other    Lisinopril Other    Lorazepam Other     Angry, agressiveness    Losartan Other     brain felt scrambled    Metformin Other and Unknown    Metoprolol Other     dizzy, faint, and scrambled my brain    Penicillin Hives    Penicillin G Hives    Penicillins Hives    Propoxyphene Hives and Other        Past Surgical History:   Procedure Laterality Date    OTHER SURGICAL HISTORY  05/31/2022    Mastectomy        Family History   Problem Relation Name Age of Onset    Arthritis Mother      Blindness Mother      Other (cardiac disorder) Mother      Deafness Mother      Alcohol abuse Father      Arthritis Father      Colon cancer Father      Other (Early death) Father      Alcohol abuse Brother      Other (Early death) Brother      Other (Early death) Maternal Grandfather          Social History     Socioeconomic History    Marital status: Single     Spouse name: Not on file    Number of children: Not on file    Years of education: Not on file    Highest education level: Not on file   Occupational History    Not on file   Tobacco Use    Smoking status: Never     Passive exposure: Never    Smokeless tobacco: Never   Vaping Use    Vaping status: Never Used   Substance and Sexual Activity    Alcohol use: Never    Drug use: Never    Sexual activity: Not on file   Other Topics Concern    Not on file   Social History Narrative    Not on file     Social Drivers of Health     Financial Resource Strain: Not on file   Food Insecurity: No Food Insecurity (9/29/2020)    [facility]    Hunger Vital Sign     Worried About Running Out of Food in the Last Year: Never true     Ran Out of Food in the Last Year: Never true   Transportation Needs: No  Transportation Needs (9/29/2020)    Received from [facility]    PRAPARE - Transportation     Lack of Transportation (Medical): No     Lack of Transportation (Non-Medical): No   Physical Activity: Insufficiently Active (9/29/2020)    Received from [facility]    Exercise Vital Sign     Days of Exercise per Week: 2 days     Minutes of Exercise per Session: 20 min   Stress: Stress Concern Present (9/29/2020)    Received from [facility] - Occupational Stress Questionnaire     Feeling of Stress : To some extent   Social Connections: Unknown (9/29/2020)    Received from [facility]    Social Connection and Isolation Panel [NHANES]     Frequency of Communication with Friends and Family: More than three times a week     Frequency of Social Gatherings with Friends and Family: Once a week     Attends Bahai Services: 1 to 4 times per year     Active Member of Clubs or Organizations: No     Attends Club or Organization Meetings: Never     Marital Status: Patient declined   Intimate Partner Violence: Not on file   Housing Stability: Low Risk  (9/29/2020)    Received from [facility]    Housing Stability Vital Sign     Unable to Pay for Housing in the Last Year: No     Number of Places Lived in the Last Year: 1     Unstable Housing in the Last Year: No        CURRENT MEDICATIONS:   Current Outpatient Medications   Medication Sig Dispense Refill    alendronate (Fosamax) 70 mg tablet Take 1 tablet (70 mg) by mouth every 7 days. 12 tablet 3    ALPRAZolam (Xanax) 0.25 mg tablet Take 1 tablet (0.25 mg) by mouth. Take 30 minutes before your scheduled MRI, can repeat once if needed.      ALPRAZolam (Xanax) 1 mg tablet Take 1 tablet (1 mg) by mouth 4 times a day as needed for anxiety.      amLODIPine (Norvasc) 5 mg tablet Take 1 tablet (5 mg) by mouth once daily. 90 tablet 1     benztropine (Cogentin) 1 mg tablet 3 times a day as needed (restlessness tension).      bisacodyl (Dulcolax) 5 mg EC tablet Take by mouth See administration instructions. TAKE 2 TABLETS AFTER DRINKING 1/2 BOTTLE OF GATORADE AND THEN REPEAT 2 HOURS LATER      buPROPion XL (Wellbutrin XL) 300 mg 24 hr tablet Take 1 tablet (300 mg) by mouth once daily. 90 tablet 3    calcium carbonate-vitamin D3 (Oscal-500) 500 mg-10 mcg (400 unit) tablet Take 1 tablet by mouth 2 times a day.      clonazePAM (KlonoPIN) 1 mg tablet Take 1 tablet (1 mg) by mouth once daily.      cloNIDine (Catapres) 0.1 mg tablet Take 1 tablet (0.1 mg) by mouth once daily. 90 tablet 1    cromolyn (Opticrom) 4 % ophthalmic solution Administer 1 drop into affected eye(s) once daily at bedtime.      diclofenac sodium (Arthritis Pain, diclofenac,) 1 % gel Apply 2.25 inches (2 g) topically 4 times a day as needed (pain). 100 g 3    furosemide (Lasix) 20 mg tablet Take 2 tablets (40 mg) by mouth once daily. 180 tablet 1    ketorolac (Acular) 0.5 % ophthalmic solution Administer 1 drop into both eyes every 12 hours if needed.      multivitamin tablet Take 1 tablet by mouth once daily.      naloxone (Narcan) 4 mg/0.1 mL nasal spray Administer 1 spray (4 mg) into affected nostril(s) if needed for opioid reversal. May repeat every 2-3 minutes if needed, alternating nostrils, until medical assistance becomes available. 2 each 0    OLANZapine (ZyPREXA) 10 mg tablet Take 1 tablet (10 mg) by mouth once daily at bedtime. 90 tablet 3    omeprazole (PriLOSEC) 20 mg DR capsule Take 1 capsule (20 mg) by mouth once daily in the morning. Take before meals. 90 capsule 3    rOPINIRole (Requip) 0.5 mg tablet Take 1 tablet (0.5 mg) by mouth as needed at bedtime (restlessness).      spironolactone (Aldactone) 25 mg tablet Take 1 tablet (25 mg) by mouth once daily. 90 tablet 1    traMADol (Ultram) 50 mg tablet Take 1 tablet (50 mg) by mouth once daily as needed for moderate  pain (4 - 6). 10 tablet 0    triamcinolone (Kenalog) 0.1 % cream twice a day. RUB  IN A THIN FILM TO AFFECTED AREAS .(AM AND PM).      valACYclovir (Valtrex) 1 gram tablet Take 1 tablet (1,000 mg) by mouth 3 times a day.       No current facility-administered medications for this visit.        OBJECTIVE    PHYSICAL EXAM  There is no height or weight on file to calculate BMI.    General: Well-appearing female in no acute distress.  Awake, alert and oriented.  Pleasant and cooperative.  Respiratory: Non-labored breathing  Mood: Euthymic   Gait: Mild antalgia  Assistive Device: None     Affected Right Knee  Limb Alignment: Mild varus  ROM: 5-115  Stable to varus and valgus stress at full extension and 30 degrees of flexion  Skin: Intact, no abrasions or draining sinuses  Effusion: None  Quad Strength: 5/5  Hamstring Strength: 5/5  Patella Crepitus: None  Patella Grind: Negative  Tenderness: Medial and lateral joint line.  More sensitive on the lateral joint line.  Sensation: Intact to light touch distally  Motor function: Able to fire TA, EHL, G/S  Pulses: Palpable DP pulse    Right hip exam  Able to tolerate 90 degrees of flexion.  0 internal rotation.  About 5 to 10 degrees of external rotation.  Able to extend the hip fully.  Able to abduct approximately 15 degrees.  Mild tenderness over the trochanteric bursa.    Left hip exam  Able to tolerate 90 degrees of flexion.  0 internal rotation.  About 5 to 10 degrees of external rotation.  Able to extend the hip fully.  Able to abduct approximately 15 degrees.  Mild tenderness over the trochanteric bursa.    IMAGING:  AP / lateral/ mid-flexion/sunrise views: Independent review of right knee x-rays was performed. The findings were reviewed with the patient. There are moderate degenerative changes of the right knee with varus limb alignment. There is joint space narrowing, subchondral sclerosis, and osteophyte formation. No evidence of fracture, AVN, dislocation,  osteomyelitis.      Radiographs of the right hip and pelvis were obtained in [facility].  Patient has advanced osteoarthritis of the left hip.  She also has severe arthritis of the right hip but is not as advanced as the left hip.  No evidence of fracture or dislocation.      ASSESSMENT & PLAN    IMPRESSION:  Mackenzie Baker is a 74 y.o. female with advanced osteoarthritis of the left hip, severe osteoarthritis of the right hip and moderate osteoarthritis of the right knee.  The patient has moderate symptoms.  They are not interfering with her ability to perform activities of daily living.  I believe that her arthritis is likely contributing to her poor balance but is not in and of itself responsible for her recent fall.    PLAN:  We discussed treatment plans for all of her joints.  It is my impression that the patient's right knee pain is referred from her right hip.  I would recommend strongly against further intra-articular injections into the right hip.  I believe that the patient is at risk of further joint deterioration by the intra-articular injections.  If she continues to have worsening pain in her right hip, I think that the best treatment would be a hip replacement.    The other alternative is that she is having pain in her right knee due to her right knee osteoarthritis.  If that is the case, she may benefit from a right knee intra-articular injection.  The patient declined an intra-articular injection today.  She states that her symptoms are not severe enough to warrant intra-articular injection at this juncture.    I will see the patient back in 3 months.  No repeat imaging is required.  If her symptoms are still present, we will revisit her candidacy for right knee intra-articular injection.  I believe that the right knee intra-articular injection will help to localize her symptoms to the right knee as opposed to referred pain from her right hip.    *This note was created using voice recognition  software and was not corrected for typographical or grammatical errors.*

## 2024-12-03 ENCOUNTER — APPOINTMENT (OUTPATIENT)
Dept: PHYSICAL THERAPY | Facility: CLINIC | Age: 74
End: 2024-12-03
Payer: MEDICARE

## 2024-12-31 ENCOUNTER — EVALUATION (OUTPATIENT)
Dept: PHYSICAL THERAPY | Facility: CLINIC | Age: 74
End: 2024-12-31
Payer: MEDICARE

## 2024-12-31 DIAGNOSIS — R26.89 LOSS OF BALANCE: ICD-10-CM

## 2024-12-31 DIAGNOSIS — M16.11 PRIMARY LOCALIZED OSTEOARTHRITIS OF RIGHT HIP: ICD-10-CM

## 2024-12-31 DIAGNOSIS — M25.552 BILATERAL HIP PAIN: Primary | ICD-10-CM

## 2024-12-31 DIAGNOSIS — M25.561 RIGHT KNEE PAIN, UNSPECIFIED CHRONICITY: ICD-10-CM

## 2024-12-31 DIAGNOSIS — M25.561 CHRONIC PAIN OF RIGHT KNEE: ICD-10-CM

## 2024-12-31 DIAGNOSIS — M25.551 BILATERAL HIP PAIN: Primary | ICD-10-CM

## 2024-12-31 DIAGNOSIS — G89.29 CHRONIC PAIN OF RIGHT KNEE: ICD-10-CM

## 2024-12-31 PROCEDURE — 97110 THERAPEUTIC EXERCISES: CPT | Mod: GP | Performed by: PHYSICAL THERAPIST

## 2024-12-31 PROCEDURE — 97161 PT EVAL LOW COMPLEX 20 MIN: CPT | Mod: GP | Performed by: PHYSICAL THERAPIST

## 2024-12-31 ASSESSMENT — ENCOUNTER SYMPTOMS
OCCASIONAL FEELINGS OF UNSTEADINESS: 1
LOSS OF SENSATION IN FEET: 0
DEPRESSION: 0

## 2024-12-31 NOTE — PROGRESS NOTES
Physical Therapy    Physical Therapy Evaluation and Treatment    Patient Name: Mackenzie Baker  MRN: [id number]  Encounter Date: 12/31/2024     Time Calculation  Start Time: 1255  Stop Time: 1343  Time Calculation (min): 48 min  12/30/24:  MEDICARE A/B - NO AUTH / $240 DEDUCT MET / 80% Coins / [place] visits / $972.90 used 2024 PT/ST.   [facility] SUPP ACTIVE / PER RTE, Availity [phone] / ds    Current Problem:   1. Bilateral hip pain  Follow Up In Physical Therapy      2. Primary localized osteoarthritis of right hip  Referral to Physical Therapy      3. Right knee pain, unspecified chronicity  Referral to Physical Therapy    Follow Up In Physical Therapy      4. Chronic pain of right knee        5. Loss of balance  Follow Up In Physical Therapy          Relevant Past Medical History: Back pain, HTN, GERD, OP-takes Fosamax,O/A B shoulders,hips/ pain, mm tear RT knee,pre diabetic,depressive disorder, memory difficulty ST, CHARLEEN, balance disorder    Surgical History:  '03 breast cancer mastectomy bilat(cancer free per pt)and cholecystectomy  Medications: reviewed in chart and intake form  Allergies: Allergies: side effect of meds see EMR x14 listed     Precautions: OP   STEADI Fall Risk: 8 (score of 4+ indicates fall risk)pt was educated previously by this therapist for fall prevention        SUBJECTIVE:   PT is a 75 yo female c/o hip/RT anterior thigh and RT anterior knee and left hip anterior thigh pain.  Pt also reports decreased balance   Pt with advanced hip O/A hips and a replacement candidate, pt desires to defer at this time.  Pt also has shoulder O/A.     Imaging:   Per MD note: bilateral advanced O/A hips   Knee: moderate degenerative changes of RT knee with varus limb alignment, joint space narrowing, subchondral sclerosis, osteophyte formation. No fracture, AVN, dislocation, osteomyelitis.       Pain:   Current: RT hip 1/10, RT knee 4 anterior and to shin 4/10, Lt hip thigh 2/10  Lowest: 0/10  Highest: 4/10 night  joel knee at night     Onset: couple years   Description: ache   Aggravating Factors: laying for a while hips  Relieving Factors: ice heat alternate   Sleep Pattern: back and side   Previous Interventions: Prior PT for balance x12 ended        Red flags: none  Occupation: retired   Hobbies: sits and pedals   Home Living: alone, ranch, encounters stairs in community one at time with hand rail   Current Level of Function: walk 10 and gets SOB  Prior Level of Function: could walk 30 min     Patient/Family Goal: improve ability to perform stairs joel if no hand rail is present    Outcome Measures: WOMAC 45/96 47% impairment worse:stairs, sit stand, car transfers, don socks uses sock aid pt also has reacher's       OBJECTIVE:    Cognition: some STM deficits per pt  Posture: flexed spine    Gait: arrives without SPC , but can use occasional in house if having pain, walks with wider ASHKAN   Functional Mobility: sit to stand uses B UE support  TU.57 sec no assistive device   Palpation: joint line tenderness and quad tendon, none trochanteric bursa   Joint Mobility:     HIP ROM:  Flexion to 90 deg pain, left, 85 right pain hip   Hip extension WFL  ER 50% loss LT, 25% RT  IR  5, 5 deg   Abduction 15 deg, 20 groin pain     KNEE ROM:supine 3-115 RT, 5-110 left     LE STRENGTH:RT/LT   Psoas 4, 4  Hip add 5, 5  Hip ab 3, 3  Quad 5 soreness back Rt knee , 5   Ham 4, 4   PF 5 pt can heel raise in standing     Bed transfers I with difficulty but I      Balance:  DLS EO-30 sec no LOB min sway, EC mod sway no LOB  Tandem stance LT forward LOB, RT 6 sec  SLB left 4 sec, Rt 2 sec     Treatments:  Therapeutic Exercise: (8 minutes)   Review hep/demo:daily   Standing heel and toe raises x10  Side stepping at counter x4  LAQ shorter range RT, Lt full x10  Seated Ball add x10    Manual Therapy: ( minutes)    Gait Training: ( minutes)    Neuromuscular Re-education: (5 minutes)  DLS EO/EC, tandem and SLB    Therapeutic Activity: ( 2  minutes)  Discussion re sock and aide and reacher's, stretch shoe laces to help with dressing, pacing techniques for laundry    OP Education: discussed pro cons of water therapy pt is not interested and with her fall risk could be challenging.       HEP:  See sameer above    Response to treatment: no worse if anything looser     ASSESSMENT:   Pt is a 75 yo female c/o hip/RT anterior thigh and RT anterior knee pain  Pt presents with:altered gait, loss hip and knee rom and strength, balance dysfunction   WOMAC points indicating impaired function  Co morbidities:decreased balance, OP limit end range spinal flexion,   Pt could benefit from PT to address the above impairments and increase function    Complexity of Evaluation: low   Based on the history including personal factors and/or comorbidities, examination of body systems including body structures and function, activity limitations, and/or participation restrictions, as well as clinical presentation, patient meets criteria for a low  complexity evaluation.     PLAN:  OP PT PLAN:  Treatment/Interventions: Cryotherapy , Education/Instruction , Gait training , Manual Therapy  , Neuromuscular re-education , Self care/home management , Therapeutic activities , and Therapeutic exercise    PT Plan: Skilled PT   PT Frequency: 2 times per week   Duration:8  Insurance: 2/30/24:  MEDICARE A/B - NO AUTH / $240 DEDUCT MET / 80% Coins / [place] visits / $972.90 used 2024 PT/ST.   [facility] SUPP ACTIVE / PER RTE, Availity [phone] / ds    Visits Approved: [place]  Certification Period Start Date: 12/31/24  Certification Period End Date: 3/31/24  Rehab Potential: Fair  Plan of Care Agreement: Patient         Goals:   STG 8  Pt Jing with HEP  Decrease pain 25% in hips and knees  Increase rom of knees and hips 5 deg   LTG 16  Increase LE strength 1 MMT to allow pt to perform a 6 inch step without needing UE support-pt goal  Womac scores will improve 10 points currently 45/96   Balance pt will be  able to tandem or NBOS stand for 10 sec to help with fall prevention  TUG scores will improve 1 sec, currently 14.57 sec, reducing fall risk

## 2025-01-06 ENCOUNTER — TREATMENT (OUTPATIENT)
Dept: PHYSICAL THERAPY | Facility: CLINIC | Age: 75
End: 2025-01-06
Payer: MEDICARE

## 2025-01-06 DIAGNOSIS — M25.561 RIGHT KNEE PAIN, UNSPECIFIED CHRONICITY: ICD-10-CM

## 2025-01-06 DIAGNOSIS — M25.551 BILATERAL HIP PAIN: Primary | ICD-10-CM

## 2025-01-06 DIAGNOSIS — M25.552 BILATERAL HIP PAIN: Primary | ICD-10-CM

## 2025-01-06 DIAGNOSIS — M25.561 CHRONIC PAIN OF RIGHT KNEE: ICD-10-CM

## 2025-01-06 DIAGNOSIS — R26.89 LOSS OF BALANCE: ICD-10-CM

## 2025-01-06 DIAGNOSIS — G89.29 CHRONIC PAIN OF RIGHT KNEE: ICD-10-CM

## 2025-01-06 PROCEDURE — 97110 THERAPEUTIC EXERCISES: CPT | Mod: GP | Performed by: PHYSICAL THERAPIST

## 2025-01-06 NOTE — PROGRESS NOTES
Physical Therapy    Physical Therapy Treatment    Patient Name: Mackenzie Baker  MRN: [id number]  Encounter Date: 1/6/2025     Time Calculation  Start Time: 1345  Stop Time: 1423  Time Calculation (min): 38 min    Visit #: 2  out of 16  Insurance: MEDICARE A/B - NO AUTH / $240 DEDUCT MET / 80% Coins / [place] visits / $972.90 used 2024 PT/ST.   [facility] SUPP ACTIVE / PER RTE, Availity [phone] / ds    Visits Approved: [place]  Evaluation date: 12/31/24      Current Problem:   1. Bilateral hip pain        2. Chronic pain of right knee            SUBJECTIVE:   Pt      Precautions: Back pain, HTN, GERD, OP-takes Fosamax,O/A B shoulders,hips/ pain, mm tear RT knee,pre diabetic,depressive disorder, memory difficulty ST, CHARLEEN, balance disorder    Surgical History:  '03 breast cancer mastectomy bilat(cancer free per pt)and cholecystectomy        Pain:   Start of session: 4/10 left hip and occasional knee        OBJECTIVE:    Less ER hip left in hook vs RT     Treatments:  Therapeutic Exercise: (38 minutes)  Nu-step left UE and B LE level 1 /2 min  Standing heel and toe raises x15  Side stepping bar x6  Seated:  LAQ shorter range RT 2 x10, Lt full 2 x10  Isometric  Ball hip adduction x20  Supine:  -Gluteal sets 2x10  -ankle pumps with slide board x10  -hip bent knee fall out x5/5 ea leg   -gentle PT assist SLR ham stretch x3 15 sec   -hook small rock LTR x10    Manual Therapy: ( minutes)    Gait Training: ( minutes)    Neuromuscular Re-education: ( minutes)    Therapeutic Activity: ( minutes)  Discussion re sock and aide and reacher's, stretch shoe laces to help with dressing, pacing techniques for laundry      HEP:  Review hep/demo:daily   Standing heel and toe raises x10  Side stepping at counter x4  LAQ shorter range RT, Lt full x10  Seated Ball add x10       ASSESSMENT:   Pt did good with program with modified ranges and reported decreased pain post    Pt unable to do heel slides on left due to pain left hip     Post session pain:  feels less pain post      PLAN:  See how pt did with starting light mobility sameer   Add some balance sameer as well   OP PT PLAN:  Treatment/Interventions: Cryotherapy , Education/Instruction , Gait training , Manual Therapy  , Neuromuscular re-education , Self care/home management , Therapeutic activities , and Therapeutic exercise    PT Plan: Skilled PT   PT Frequency: 2 times per week   Duration:8  Certification Period Start Date: 12/31/24  Certification Period End Date: 3/31/24  Visits Approved: 16  Rehab Potential: Fair  Plan of Care Agreement: Patient         Goals:   STG 8  Pt Jing with HEP  Decrease pain 25% in hips and knees  Increase rom of knees and hips 5 deg   LTG 16  Increase LE strength 1 MMT to allow pt to perform a 6 inch step without needing UE support-pt goal  Womac scores will improve 10 points currently 45/96   Balance pt will be able to tandem or NBOS stand for 10 sec to help with fall prevention  TUG scores will improve 1 sec, currently 14.57 sec, reducing fall risk

## 2025-01-08 ENCOUNTER — TREATMENT (OUTPATIENT)
Dept: PHYSICAL THERAPY | Facility: CLINIC | Age: 75
End: 2025-01-08
Payer: MEDICARE

## 2025-01-08 DIAGNOSIS — M25.552 BILATERAL HIP PAIN: ICD-10-CM

## 2025-01-08 DIAGNOSIS — M25.561 RIGHT KNEE PAIN, UNSPECIFIED CHRONICITY: ICD-10-CM

## 2025-01-08 DIAGNOSIS — I10 BENIGN ESSENTIAL HYPERTENSION: ICD-10-CM

## 2025-01-08 DIAGNOSIS — I10 ESSENTIAL HYPERTENSION: ICD-10-CM

## 2025-01-08 DIAGNOSIS — G89.29 CHRONIC PAIN OF RIGHT KNEE: Primary | ICD-10-CM

## 2025-01-08 DIAGNOSIS — R26.89 LOSS OF BALANCE: ICD-10-CM

## 2025-01-08 DIAGNOSIS — M25.551 BILATERAL HIP PAIN: ICD-10-CM

## 2025-01-08 DIAGNOSIS — M25.561 CHRONIC PAIN OF RIGHT KNEE: Primary | ICD-10-CM

## 2025-01-08 PROCEDURE — 97110 THERAPEUTIC EXERCISES: CPT | Mod: GP | Performed by: PHYSICAL THERAPIST

## 2025-01-08 RX ORDER — AMLODIPINE BESYLATE 5 MG/1
5 TABLET ORAL DAILY
Qty: 90 TABLET | Refills: 3 | Status: SHIPPED | OUTPATIENT
Start: 2025-01-08

## 2025-01-08 RX ORDER — CLONIDINE HYDROCHLORIDE 0.1 MG/1
0.1 TABLET ORAL DAILY
Qty: 90 TABLET | Refills: 3 | Status: SHIPPED | OUTPATIENT
Start: 2025-01-08

## 2025-01-08 RX ORDER — SPIRONOLACTONE 25 MG/1
25 TABLET ORAL DAILY
Qty: 90 TABLET | Refills: 3 | Status: SHIPPED | OUTPATIENT
Start: 2025-01-08

## 2025-01-08 NOTE — PROGRESS NOTES
Physical Therapy    Physical Therapy Treatment    Patient Name: Mackenzie Baker  MRN: [id number]  Encounter Date: 1/8/2025     Time Calculation  Start Time: 1300  Stop Time: 1340  Time Calculation (min): 40 min    Visit #: 3  out of 16  Insurance: MEDICARE A/B - NO AUTH / $240 DEDUCT MET / 80% Coins / [place] visits / $972.90 used 2024 PT/ST.   [facility] SUPP ACTIVE / PER RTE, Availity [phone] / ds    Visits Approved: [place]  Evaluation date: 12/31/24      Current Problem:   1. Chronic pain of right knee        2. Right knee pain, unspecified chronicity  Follow Up In Physical Therapy      3. Bilateral hip pain  Follow Up In Physical Therapy      4. Loss of balance  Follow Up In Physical Therapy          SUBJECTIVE:   Pt states her shoulders and RT leg were painful last night    Pt reported hips felt better after her last rx   Precautions: Back pain, HTN, GERD, Osteopenia hip, normal spine-takes Fosamax,O/A B shoulders,hips/ pain, mm tear RT knee,pre diabetic,depressive disorder, memory difficulty ST, CHARLEEN, balance disorder    Surgical History:  '03 breast cancer mastectomy bilat(cancer free per pt)and cholecystectomy        Pain:   Start of session: 2/10 left/right hips and knees        OBJECTIVE:    Less ER and abduction hip left in hook vs RT     Treatments:  Therapeutic Exercise: ( 40 minutes)  Nu-step left UE and B LE level 1 /2 min  Standing heel and toe raises 2x10  Standing calf stretch slant board 3x15  Side stepping bar x 6 no hands    Seated:  LAQ shorter range RT 3 x10, Lt  3 x10  Isometric Ball hip adduction 2x12  DF x10 ea   Supine:  -Gluteal sets 2x10/ 5 sec   Mini bridge x10  -ankle pumps with slide board x15 LT/RT  Trial of hip abd with slide board left painful so deferred, RT leg 1x8 with PT AAROM  -hip bent knee fall out x5/5 ea leg PT assist   -gentle PT assist SLR ham stretch x3 15 sec   -hook small rock LTR x15    Manual Therapy: ( minutes)    Gait Training: ( minutes)    Neuromuscular Re-education: (  minutes)    Therapeutic Activity: ( minutes)  Discussion re sock and aide and reacher's, stretch shoe laces to help with dressing, pacing techniques for laundry      HEP:  Review hep/demo:daily   Standing heel and toe raises x10  Side stepping at counter x4  LAQ shorter range RT, Lt full x10  Seated Ball add x10       ASSESSMENT:   Pt did good with program with modified ranges   Pt unable to do heel slides on left due to pain left hip into flex or abd and very limited ER comfort range as well  RT hip has better rom and less pain.      Post session pain: feels stretched, no worse      PLAN:  See how pt did with starting light mobility sameer   Add some balance sameer as well   OP PT PLAN:  Treatment/Interventions: Cryotherapy , Education/Instruction , Gait training , Manual Therapy  , Neuromuscular re-education , Self care/home management , Therapeutic activities , and Therapeutic exercise    PT Plan: Skilled PT   PT Frequency: 2 times per week   Duration:8  Certification Period Start Date: 12/31/24  Certification Period End Date: 3/31/24  Visits Approved: 16  Rehab Potential: Fair  Plan of Care Agreement: Patient         Goals:   STG 8  Pt Jing with HEP  Decrease pain 25% in hips and knees  Increase rom of knees and hips 5 deg   LTG 16  Increase LE strength 1 MMT to allow pt to perform a 6 inch step without needing UE support-pt goal  Womac scores will improve 10 points currently 45/96   Balance pt will be able to tandem or NBOS stand for 10 sec to help with fall prevention  TUG scores will improve 1 sec, currently 14.57 sec, reducing fall risk

## 2025-01-13 ENCOUNTER — TREATMENT (OUTPATIENT)
Dept: PHYSICAL THERAPY | Facility: CLINIC | Age: 75
End: 2025-01-13
Payer: MEDICARE

## 2025-01-13 DIAGNOSIS — M25.561 CHRONIC PAIN OF RIGHT KNEE: Primary | ICD-10-CM

## 2025-01-13 DIAGNOSIS — G89.29 CHRONIC PAIN OF RIGHT KNEE: Primary | ICD-10-CM

## 2025-01-13 DIAGNOSIS — M25.551 BILATERAL HIP PAIN: ICD-10-CM

## 2025-01-13 DIAGNOSIS — R26.89 LOSS OF BALANCE: ICD-10-CM

## 2025-01-13 DIAGNOSIS — M25.561 RIGHT KNEE PAIN, UNSPECIFIED CHRONICITY: ICD-10-CM

## 2025-01-13 DIAGNOSIS — M25.552 BILATERAL HIP PAIN: ICD-10-CM

## 2025-01-13 PROCEDURE — 97110 THERAPEUTIC EXERCISES: CPT | Mod: GP | Performed by: PHYSICAL THERAPIST

## 2025-01-13 NOTE — PROGRESS NOTES
Physical Therapy    Physical Therapy Treatment    Patient Name: Mackenzie Baker  MRN: [id number]  Encounter Date: 1/13/2025     Time Calculation  Start Time: 1245  Stop Time: 1327  Time Calculation (min): 42 min    Visit #: 4  out of 16  Insurance:   Cert sent 12/31/24, signed 1/6  MEDICARE A/B - NO AUTH / $240 DEDUCT MET / 80% Coins / [place] visits / $972.90 used 2024 PT/ST.   [facility] SUPP ACTIVE / PER RTE, Availity [phone] / ds    Visits Approved: [place]  Evaluation date: 12/31/24      Current Problem:   1. Chronic pain of right knee        2. Right knee pain, unspecified chronicity  Follow Up In Physical Therapy      3. Bilateral hip pain  Follow Up In Physical Therapy      4. Loss of balance  Follow Up In Physical Therapy          SUBJECTIVE:   Pt did ok with last session, shoulder better   Pt reported hips felt better after her last rx   Precautions: Back pain, HTN, GERD, Osteopenia hip, normal spine-takes Fosamax,O/A B shoulders,hips/ pain, mm tear RT knee,pre diabetic,depressive disorder, memory difficulty ST, CHARLEEN, balance disorder    Surgical History:  '03 breast cancer mastectomy bilat(cancer free per pt)and cholecystectomy        Pain:   Start of session: 2/10 left/right hips and knees        OBJECTIVE:    Less ER and abduction hip left in hook vs RT     Treatments:  Therapeutic Exercise: ( 42 minutes)  Nu-step B LE level 1, 2 min-15 sec , legs only due to shoulder pain if arms used  Standing heel and toe raises 2x12  Standing calf stretch slant board 3x20  Side stepping bar x 8 no hands  March x10 ea     Seated:  LAQ shorter range RT  x10/1#, Lt   x10/1#  Isometric Ball hip adduction 2x13 2 inch block under feet   DF x12 ea   Knee flexion Heel slides x10 ea     Supine:  -Gluteal sets 2x10/ 5 sec   Mini bridge 2x10  -ankle pumps with slide board x15 LT/RT  Trial of hip abd with slide board left painful so deferred, RT leg 1x10 with PT AAROM  -hip bent knee fall out x5/5 ea leg PT assist   -gentle PT assist SLR  ham stretch x3 15 sec   -hook small rock LTR x20  -hip abd hook isometric     Manual Therapy: ( minutes)    Gait Training: ( minutes)    Neuromuscular Re-education: ( minutes)    Therapeutic Activity: ( minutes)  Discussion re sock and aide and reacher's, stretch shoe laces to help with dressing, pacing techniques for laundry      HEP:  Review hep/demo:daily   Standing heel and toe raises x10  Side stepping at counter x4  LAQ shorter range RT, Lt full x10  Seated Ball add x10       ASSESSMENT:   Pt did good with program with modified ranges   Pt getting stronger   RT hip has better rom and less pain.      Post session pain: feels stretched, no worse      PLAN:  Add some balance sameer as well   OP PT PLAN:  Treatment/Interventions: Cryotherapy , Education/Instruction , Gait training , Manual Therapy  , Neuromuscular re-education , Self care/home management , Therapeutic activities , and Therapeutic exercise    PT Plan: Skilled PT   PT Frequency: 2 times per week   Duration:8  Certification Period Start Date: 12/31/24  Certification Period End Date: 3/31/24  Visits Approved: 16  Rehab Potential: Fair  Plan of Care Agreement: Patient         Goals:   STG 8  Pt Jing with HEP  Decrease pain 25% in hips and knees  Increase rom of knees and hips 5 deg   LTG 16  Increase LE strength 1 MMT to allow pt to perform a 6 inch step without needing UE support-pt goal  Womac scores will improve 10 points currently 45/96   Balance pt will be able to tandem or NBOS stand for 10 sec to help with fall prevention  TUG scores will improve 1 sec, currently 14.57 sec, reducing fall risk

## 2025-01-15 ENCOUNTER — TREATMENT (OUTPATIENT)
Dept: PHYSICAL THERAPY | Facility: CLINIC | Age: 75
End: 2025-01-15
Payer: MEDICARE

## 2025-01-15 DIAGNOSIS — M25.561 CHRONIC PAIN OF RIGHT KNEE: ICD-10-CM

## 2025-01-15 DIAGNOSIS — G89.29 CHRONIC PAIN OF RIGHT KNEE: ICD-10-CM

## 2025-01-15 DIAGNOSIS — R26.89 LOSS OF BALANCE: ICD-10-CM

## 2025-01-15 DIAGNOSIS — M25.561 RIGHT KNEE PAIN, UNSPECIFIED CHRONICITY: ICD-10-CM

## 2025-01-15 DIAGNOSIS — M25.551 BILATERAL HIP PAIN: Primary | ICD-10-CM

## 2025-01-15 DIAGNOSIS — M25.552 BILATERAL HIP PAIN: Primary | ICD-10-CM

## 2025-01-15 PROCEDURE — 97110 THERAPEUTIC EXERCISES: CPT | Mod: GP | Performed by: PHYSICAL THERAPIST

## 2025-01-15 NOTE — PROGRESS NOTES
Physical Therapy    Physical Therapy Treatment    Patient Name: Mackenzie Baker  MRN: [id number]  Encounter Date: 1/15/2025     Time Calculation  Start Time: 1245  Stop Time: 1325  Time Calculation (min): 40 min    Visit #: 5  out of 16  Insurance:   Cert sent 12/31/24, signed 1/6  MEDICARE A/B - NO AUTH / $240 DEDUCT MET / 80% Coins / [place] visits / $972.90 used 2024 PT/ST.   [facility] SUPP ACTIVE / PER RTE, Availity [phone] / ds    Visits Approved: [place]  Evaluation date: 12/31/24      Current Problem:   1. Bilateral hip pain  Follow Up In Physical Therapy      2. Chronic pain of right knee        3. Right knee pain, unspecified chronicity  Follow Up In Physical Therapy      4. Loss of balance  Follow Up In Physical Therapy          SUBJECTIVE:   Pt did ok with last session, shoulder better   Pt reported hips felt better after her last rx   Pt reports she is afraid she will fall    Precautions: Back pain, HTN, GERD, Osteopenia hip, normal spine-takes Fosamax,O/A B shoulders,hips/ pain, mm tear RT knee,pre diabetic,depressive disorder, memory difficulty ST, CHARLEEN, balance disorder    Surgical History:  '03 breast cancer mastectomy bilat(cancer free per pt)and cholecystectomy        Pain:   Start of session: 4/10 left hip not bad right hips and knees 0/10       OBJECTIVE:    Less ER and abduction hip left in hook vs RT     Treatments:  Therapeutic Exercise: (  36 minutes)  Nu-step B LE level 1, 3 min  , legs only due to shoulder pain if arms used  Standing heel  raises 2x13  Standing calf stretch slant board 3x20  Side stepping bar x 8 no hands  March x10 ea     Seated:  LAQ shorter range RT  2x10/1#, Lt   2x10/1#  Isometric Ball hip adduction 2x14, 2 inch block under feet   DF x15 ea   Knee flexion Heel slides x15 ea     Supine:  -Gluteal sets 2x10/ 5 sec   Mini bridge 2x12  -ankle pumps with slide board x15 LT/RT  Trial of hip abd with slide board left painful so deferred, RT leg 1x15 with PT AAROM  -hip bent knee fall out  x5/5 ea leg PT assist   -gentle PT assist SLR ham stretch x3 15 sec   -hook small rock LTR x20  -hip abd hook isometric against peach band x10 3 sec   -hook TVA x10 3 sec     Manual Therapy: ( minutes)    Gait Training: ( minutes)    Neuromuscular Re-education: (  4 minutes)  DLS 1 EC 30 sec ea   Stagger stance EO x30 ea     Therapeutic Activity: ( minutes)  Discussion re sock and aide and reacher's, stretch shoe laces to help with dressing, pacing techniques for laundry      HEP:  Review hep/demo:daily   Standing heel and toe raises x10  Side stepping at counter x4  LAQ shorter range RT, Lt full x10  Seated Ball add x10       ASSESSMENT:   Pt did good with program with modified ranges   RT hip has better rom and less pain.    Pt challenged with more NBOS making her a fall risk    Post session pain: feels stretched, no worse      PLAN:  Add some balance sameer as well   OP PT PLAN:  Treatment/Interventions: Cryotherapy , Education/Instruction , Gait training , Manual Therapy  , Neuromuscular re-education , Self care/home management , Therapeutic activities , and Therapeutic exercise    PT Plan: Skilled PT   PT Frequency: 2 times per week   Duration:8  Certification Period Start Date: 12/31/24  Certification Period End Date: 3/31/24  Visits Approved: 16  Rehab Potential: Fair  Plan of Care Agreement: Patient         Goals:   STG 8  Pt Jing with HEP  Decrease pain 25% in hips and knees  Increase rom of knees and hips 5 deg   LTG 16  Increase LE strength 1 MMT to allow pt to perform a 6 inch step without needing UE support-pt goal  Womac scores will improve 10 points currently 45/96   Balance pt will be able to tandem or NBOS stand for 10 sec to help with fall prevention  TUG scores will improve 1 sec, currently 14.57 sec, reducing fall risk

## 2025-01-20 ENCOUNTER — APPOINTMENT (OUTPATIENT)
Dept: PHYSICAL THERAPY | Facility: CLINIC | Age: 75
End: 2025-01-20
Payer: MEDICARE

## 2025-01-21 DIAGNOSIS — F32.2 CURRENT SEVERE EPISODE OF MAJOR DEPRESSIVE DISORDER WITHOUT PSYCHOTIC FEATURES, UNSPECIFIED WHETHER RECURRENT (MULTI): ICD-10-CM

## 2025-01-21 RX ORDER — BUPROPION HYDROCHLORIDE 300 MG/1
300 TABLET ORAL DAILY
Qty: 90 TABLET | Refills: 3 | Status: SHIPPED | OUTPATIENT
Start: 2025-01-21

## 2025-01-22 ENCOUNTER — APPOINTMENT (OUTPATIENT)
Dept: PHYSICAL THERAPY | Facility: CLINIC | Age: 75
End: 2025-01-22
Payer: MEDICARE

## 2025-01-27 ENCOUNTER — TREATMENT (OUTPATIENT)
Dept: PHYSICAL THERAPY | Facility: CLINIC | Age: 75
End: 2025-01-27
Payer: MEDICARE

## 2025-01-27 DIAGNOSIS — R26.89 LOSS OF BALANCE: ICD-10-CM

## 2025-01-27 DIAGNOSIS — M25.561 RIGHT KNEE PAIN, UNSPECIFIED CHRONICITY: ICD-10-CM

## 2025-01-27 DIAGNOSIS — G89.29 CHRONIC PAIN OF RIGHT KNEE: Primary | ICD-10-CM

## 2025-01-27 DIAGNOSIS — M25.561 CHRONIC PAIN OF RIGHT KNEE: Primary | ICD-10-CM

## 2025-01-27 DIAGNOSIS — M25.552 BILATERAL HIP PAIN: ICD-10-CM

## 2025-01-27 DIAGNOSIS — M25.551 BILATERAL HIP PAIN: ICD-10-CM

## 2025-01-27 PROCEDURE — 97110 THERAPEUTIC EXERCISES: CPT | Mod: GP | Performed by: PHYSICAL THERAPIST

## 2025-01-27 NOTE — PROGRESS NOTES
Physical Therapy    Physical Therapy Treatment    Patient Name: Mackenzie Baker  MRN: [id number]  Encounter Date: 1/27/2025     Time Calculation  Start Time: 1245  Stop Time: 1330  Time Calculation (min): 45 min    Visit #: 6  out of 16  Insurance:   Cert sent 12/31/24, signed 1/6  MEDICARE A/B - NO AUTH / $240 DEDUCT MET / 80% Coins / [place] visits / $972.90 used 2024 PT/ST.   [facility] SUPP ACTIVE / PER RTE, Availity [phone] / ds    Visits Approved: [place]  Evaluation date: 12/31/24      Current Problem:   1. Chronic pain of right knee        2. Right knee pain, unspecified chronicity  Follow Up In Physical Therapy      3. Bilateral hip pain  Follow Up In Physical Therapy      4. Loss of balance  Follow Up In Physical Therapy          SUBJECTIVE:   Ok after her last session  Pt had flare up yesterday afternoon  Pt reports she is afraid she will fall  RT shoulder >Lt 5/10  Precautions: Back pain, HTN, GERD, Osteopenia hip, normal spine-takes Fosamax,O/A B shoulders,hips/ pain, mm tear RT knee,pre diabetic,depressive disorder, memory difficulty ST, CHARLEEN, balance disorder    Surgical History:  '03 breast cancer mastectomy bilat(cancer free per pt)and cholecystectomy        Pain:   Start of session: 1/10 left hip/knee       OBJECTIVE:    Less ER and abduction hip left in hook vs RT     Treatments:  Therapeutic Exercise: (   40  minutes)  Nu-step B LE level 1/4 min  , legs only due to shoulder pain if arms used  Standing heel  raises 2x15  Standing calf stretch slant board 3 x 20 sec  Side stepping bar x 8 no hands  March 2x15 ea     Seated:  LAQ shorter range RT  2x10/1.5#, Lt   2x10/1.5#  Isometric Ball hip adduction 2x15, 2 inch block under feet   DF x20 ea   Knee flexion Heel slides x20 ea     Supine:  -Gluteal sets 2x10/ 5 sec   Mini bridge 2x13  -ankle pumps with slide board x15 LT/RT  Trial of hip abd with slide board left painful so deferred, RT leg 1x15 with PT AAROM  -hip bent knee fall out x5/5 ea leg PT assist    -gentle PT assist SLR ham stretch x3 15 sec   -hook small rock LTR x20  -hip abd hook isometric against peach band x15 3 sec   -hook TVA x10 3 sec     Manual Therapy: ( minutes)    Gait Training: ( minutes)    Neuromuscular Re-education: ( 5  minutes)  DLS 1 EC 30 sec ea   Stagger stance EO x30 ea   DLS side/side wt shift, front/back x10 on foam    Therapeutic Activity: ( minutes)  Discussion re sock and aide and reacher's, stretch shoe laces to help with dressing, pacing techniques for laundry      HEP:  Review hep/demo:daily   Standing heel and toe raises x10  Side stepping at counter x4  LAQ shorter range RT, Lt full x10  Seated Ball add x10       ASSESSMENT:   Pt did good with program with modified ranges   RT hip has better rom and less pain.    Pt challenged with more NBOS making her a fall risk    Post session pain: feels stretched, no worse      PLAN:  Add some balance sameer as well   OP PT PLAN:  Treatment/Interventions: Cryotherapy , Education/Instruction , Gait training , Manual Therapy  , Neuromuscular re-education , Self care/home management , Therapeutic activities , and Therapeutic exercise    PT Plan: Skilled PT   PT Frequency: 2 times per week   Duration:8  Certification Period Start Date: 12/31/24  Certification Period End Date: 3/31/24  Visits Approved: 16  Rehab Potential: Fair  Plan of Care Agreement: Patient         Goals:   STG 8  Pt Jing with HEP>>met  Decrease pain 25% in hips and knees  Increase rom of knees and hips 5 deg   LTG 16  Increase LE strength 1 MMT to allow pt to perform a 6 inch step without needing UE support-pt goal  Womac scores will improve 10 points currently 45/96   Balance pt will be able to tandem or NBOS stand for 10 sec to help with fall prevention  TUG scores will improve 1 sec, currently 14.57 sec, reducing fall risk

## 2025-01-29 ENCOUNTER — TREATMENT (OUTPATIENT)
Dept: PHYSICAL THERAPY | Facility: CLINIC | Age: 75
End: 2025-01-29
Payer: MEDICARE

## 2025-01-29 DIAGNOSIS — R26.89 LOSS OF BALANCE: ICD-10-CM

## 2025-01-29 DIAGNOSIS — G89.29 CHRONIC PAIN OF RIGHT KNEE: Primary | ICD-10-CM

## 2025-01-29 DIAGNOSIS — M25.561 CHRONIC PAIN OF RIGHT KNEE: Primary | ICD-10-CM

## 2025-01-29 DIAGNOSIS — M25.551 BILATERAL HIP PAIN: ICD-10-CM

## 2025-01-29 DIAGNOSIS — M25.561 RIGHT KNEE PAIN, UNSPECIFIED CHRONICITY: ICD-10-CM

## 2025-01-29 DIAGNOSIS — M25.552 BILATERAL HIP PAIN: ICD-10-CM

## 2025-01-29 PROCEDURE — 97110 THERAPEUTIC EXERCISES: CPT | Mod: GP | Performed by: PHYSICAL THERAPIST

## 2025-01-29 NOTE — PROGRESS NOTES
Physical Therapy    Physical Therapy Treatment    Patient Name: Mackenzie Baker  MRN: [id number]  Encounter Date: 1/29/2025     Time Calculation  Start Time: 1300  Stop Time: 1341  Time Calculation (min): 41 min    Visit #: 7  out of 16  Insurance:   Cert sent 12/31/24, signed 1/6  MEDICARE A/B - NO AUTH / $240 DEDUCT MET / 80% Coins / [place] visits / $972.90 used 2024 PT/ST.   [facility]MO [facility] SUPP ACTIVE / PER RTE, Availity [phone] / ds    Visits Approved: [place]  Evaluation date: 12/31/24      Current Problem:   1. Chronic pain of right knee        2. Right knee pain, unspecified chronicity  Follow Up In Physical Therapy      3. Bilateral hip pain  Follow Up In Physical Therapy      4. Loss of balance  Follow Up In Physical Therapy          SUBJECTIVE:   Ok after her last session  Pt had flare up yesterday just sitting in left leg   Pt reports she is afraid she will fall  RT shoulder >Lt 5/10  Precautions: Back pain, HTN, GERD, Osteopenia hip, normal spine-takes Fosamax,O/A B shoulders,hips/ pain, mm tear RT knee,pre diabetic,depressive disorder, memory difficulty ST, CHARLEEN, balance disorder    Surgical History:  '03 breast cancer mastectomy bilat(cancer free per pt)and cholecystectomy        Pain:   Start of session: 0/10 left hip/knee       OBJECTIVE:    Less ER and abduction hip left in hook vs RT     Treatments:  Therapeutic Exercise: (   36  minutes)  Nu-step B LE level 1/4  min  , legs only due to shoulder pain if arms used  Standing heel  raises 3x10  Standing calf stretch slant board 3 x 20 sec  Side stepping bar x 7 no hands  March 2x15 ea UE support    Seated:  LAQ shorter range RT/lt  3x10/1.5#  Isometric Ball hip adduction 2x15, 2 inch block under feet   DF 2x12 ea   Knee flexion Heel slides x25 ea   Knee dangles   Supine:  -Gluteal sets 2x10/ 5 sec   Mini bridge 2x13  -ankle pumps with slide board x15 LT/RT   hip abd/add with slide board left painful so deferred, RT leg 2x10 with PT AAROM  -hip bent knee fall out x5/5  ea leg PT assist   -gentle PT assist SLR ham stretch x3 15 sec   -hook small rock LTR x20  -hip abd hook isometric against peach band 2 x10 3 sec   -hook TVA x12 3 sec     Manual Therapy: ( minutes)    Gait Training: ( minutes)    Neuromuscular Re-education: ( 5  minutes)  DLS 1 EC 30 sec ea   Stagger stance EO x30 ea   DLS side/side wt shift, front/back x15 on foam  High knee walk next to rail x4  Therapeutic Activity: ( minutes)  Discussion re sock and aide and reacher's, stretch shoe laces to help with dressing, pacing techniques for laundry      HEP:  Review hep/demo:daily   Standing heel and toe raises x10  Side stepping at counter x4  LAQ shorter range RT, Lt full x10  Seated Ball add x10       ASSESSMENT:   RT hip has better rom and less pain.    Pt challenged with more NBOS making her a fall risk, worked balance with walking.      Post session pain: feels stretched, no worse      PLAN:  Add some balance sameer as well   OP PT PLAN:  Treatment/Interventions: Cryotherapy , Education/Instruction , Gait training , Manual Therapy  , Neuromuscular re-education , Self care/home management , Therapeutic activities , and Therapeutic exercise    PT Plan: Skilled PT   PT Frequency: 2 times per week   Duration:8  Certification Period Start Date: 12/31/24  Certification Period End Date: 3/31/24  Visits Approved: 16  Rehab Potential: Fair  Plan of Care Agreement: Patient         Goals:   STG 8  Pt Jing with HEP>>met  Decrease pain 25% in hips and knees  Increase rom of knees and hips 5 deg   LTG 16  Increase LE strength 1 MMT to allow pt to perform a 6 inch step without needing UE support-pt goal  Womac scores will improve 10 points currently 45/96   Balance pt will be able to tandem or NBOS stand for 10 sec to help with fall prevention  TUG scores will improve 1 sec, currently 14.57 sec, reducing fall risk

## 2025-02-03 ENCOUNTER — APPOINTMENT (OUTPATIENT)
Dept: PHYSICAL THERAPY | Facility: CLINIC | Age: 75
End: 2025-02-03
Payer: MEDICARE

## 2025-02-05 ENCOUNTER — TREATMENT (OUTPATIENT)
Dept: PHYSICAL THERAPY | Facility: CLINIC | Age: 75
End: 2025-02-05
Payer: MEDICARE

## 2025-02-05 DIAGNOSIS — R26.89 LOSS OF BALANCE: ICD-10-CM

## 2025-02-05 DIAGNOSIS — M25.551 BILATERAL HIP PAIN: ICD-10-CM

## 2025-02-05 DIAGNOSIS — M25.561 RIGHT KNEE PAIN, UNSPECIFIED CHRONICITY: ICD-10-CM

## 2025-02-05 DIAGNOSIS — G89.29 CHRONIC PAIN OF RIGHT KNEE: Primary | ICD-10-CM

## 2025-02-05 DIAGNOSIS — M25.561 CHRONIC PAIN OF RIGHT KNEE: Primary | ICD-10-CM

## 2025-02-05 DIAGNOSIS — M25.552 BILATERAL HIP PAIN: ICD-10-CM

## 2025-02-05 PROCEDURE — 97110 THERAPEUTIC EXERCISES: CPT | Mod: GP | Performed by: PHYSICAL THERAPIST

## 2025-02-05 NOTE — PROGRESS NOTES
Physical Therapy    Physical Therapy Treatment    Patient Name: Mackenzie Baker  MRN: [id number]  Encounter Date: 2/5/2025     Time Calculation  Start Time: 1430  Stop Time: 1512  Time Calculation (min): 42 min    Visit #: 8  out of 16  Insurance:   Cert sent 12/31/24, signed 1/6, thru 3/31/25  MEDICARE A/B - NO AUTH / $240 DEDUCT MET / 80% Coins / [place] visits / $972.90 used 2024 PT/ST.   [facility] SUPP ACTIVE / PER RTE, Availity [phone] / ds    Visits Approved: [place]  Evaluation date: 12/31/24      Current Problem:   1. Chronic pain of right knee        2. Right knee pain, unspecified chronicity  Follow Up In Physical Therapy      3. Bilateral hip pain  Follow Up In Physical Therapy      4. Loss of balance  Follow Up In Physical Therapy          SUBJECTIVE:   Ok after her last session  3/10 hip  Pt is considering SIMON left as bothered her past couple days  Very difficult doing car transfers with limited hip mobility   Pt reports she is afraid she will fall  RT shoulder >Lt 5/10  Precautions: Back pain, HTN, GERD, Osteopenia hip, normal spine-takes Fosamax,O/A B shoulders,hips/ pain, mm tear RT knee,pre diabetic,depressive disorder, memory difficulty ST, CHARLENE, balance disorder    Surgical History:  '03 breast cancer mastectomy bilat(cancer free per pt)and cholecystectomy        Pain:   Start of session: 3/10 left hip/knee     OBJECTIVE:    Less ER and abduction hip left in hook vs RT     Treatments:  Therapeutic Exercise: (   37  minutes)  Nu-step B LE level 1/4  min, legs only due to shoulder pain if arms used  Standing heel  raises 3x12  Standing calf stretch slant board 3 x 20 sec  Side stepping bar x 8 no hands  March 2x15 step tap    Seated:  LAQ shorter range RT/LT  3x12/1.5#  Isometric Ball hip adduction 3x10 , 2 inch block under feet   DF 2x13 ea   Knee flexion Heel slides 3x10 ea   Knee dangles   Supine:  Mini bridge 2x15  -ankle pumps with slide board x15 LT/RT   hip abd/add with slide board left painful so  deferred, RT leg 2x10 with PT AAROM  -hip bent knee fall out x5/5 RT PT assist ,pt declined left   -gentle PT assist SLR ham stretch x3 15 sec   -hook small rock LTR x20  -hip abd hook isometric against peach band 2 x12 3 sec   -hook TVA x15 3 sec     Manual Therapy: ( minutes)    Gait Training: ( minutes)    Neuromuscular Re-education: ( 5  minutes)  DLS 1 EC 30 sec ea on foam   Stagger stance EO x30 ea   DLS side/side wt shift, front/back x20 on foam  High knee walk next to rail x4    Therapeutic Activity: ( minutes)  Discussion re sock and aide and reacher's, stretch shoe laces to help with dressing, pacing techniques for laundry      HEP:  Review hep/demo:daily   Standing heel and toe raises x10  Side stepping at counter x4  LAQ shorter range RT, Lt full x10  Seated Ball add x10       ASSESSMENT:   See STG achievement below  RT hip has better rom vs left and less pain.    RT hip is painful with more unilateral wt bearing    Pt is getting stronger pain remains about the same in hip, improved knee.    Post session pain: no change reported      PLAN:  Check long term goals and rescore WOMAC and repeat TUG  Pt is seeing doctor next week see what plan is going forward  OP PT PLAN:  Treatment/Interventions: Cryotherapy , Education/Instruction , Gait training , Manual Therapy  , Neuromuscular re-education , Self care/home management , Therapeutic activities , and Therapeutic exercise    PT Plan: Skilled PT   PT Frequency: 2 times per week   Duration:8  Certification Period Start Date: 12/31/24  Certification Period End Date: 3/31/24  Visits Approved: 16  Rehab Potential: Fair  Plan of Care Agreement: Patient         Goals:   STG 8  Pt Jing with HEP>>met  Decrease pain 25% in hips and knees>>not changed hip, knee improving   Increase rom of knees and hips 5 deg >>LT knee and hip, RT not improving and limited secondary to pain   LTG 16  Increase LE strength 1 MMT to allow pt to perform a 6 inch step without needing UE  support-pt goal  Womac scores will improve 10 points currently 45/96   Balance pt will be able to tandem or NBOS stand for 10 sec to help with fall prevention  TUG scores will improve 1 sec, currently 14.57 sec, reducing fall risk

## 2025-02-10 ENCOUNTER — APPOINTMENT (OUTPATIENT)
Dept: PHYSICAL THERAPY | Facility: CLINIC | Age: 75
End: 2025-02-10
Payer: MEDICARE

## 2025-02-12 ENCOUNTER — TREATMENT (OUTPATIENT)
Dept: PHYSICAL THERAPY | Facility: CLINIC | Age: 75
End: 2025-02-12
Payer: MEDICARE

## 2025-02-12 DIAGNOSIS — M25.561 RIGHT KNEE PAIN, UNSPECIFIED CHRONICITY: ICD-10-CM

## 2025-02-12 DIAGNOSIS — M25.552 BILATERAL HIP PAIN: ICD-10-CM

## 2025-02-12 DIAGNOSIS — G89.29 CHRONIC PAIN OF RIGHT KNEE: Primary | ICD-10-CM

## 2025-02-12 DIAGNOSIS — M25.551 BILATERAL HIP PAIN: ICD-10-CM

## 2025-02-12 DIAGNOSIS — R26.89 LOSS OF BALANCE: ICD-10-CM

## 2025-02-12 DIAGNOSIS — M25.561 CHRONIC PAIN OF RIGHT KNEE: Primary | ICD-10-CM

## 2025-02-12 PROCEDURE — 97110 THERAPEUTIC EXERCISES: CPT | Mod: GP | Performed by: PHYSICAL THERAPIST

## 2025-02-12 NOTE — PROGRESS NOTES
Physical Therapy    Physical Therapy Treatment    Patient Name: Mackenzie Baker  MRN: [id number]  Encounter Date: 2/12/2025     Time Calculation  Start Time: 1047  Stop Time: 1125  Time Calculation (min): 38 min    Visit #: 9  out of 16  Insurance:   Cert sent 12/31/24, signed 1/6, thru 3/31/25  MEDICARE A/B - NO AUTH / $240 DEDUCT MET / 80% Coins / [place] visits / $972.90 used 2024 PT/ST.   [facility]MO [facility] SUPP ACTIVE / PER RTE, Availity [phone] / ds    Visits Approved: [place]  Evaluation date: 12/31/24      Current Problem:   1. Chronic pain of right knee        2. Right knee pain, unspecified chronicity  Follow Up In Physical Therapy      3. Bilateral hip pain  Follow Up In Physical Therapy      4. Loss of balance  Follow Up In Physical Therapy          SUBJECTIVE:   Ok after her last session  Pt is going to see hip specialist wed and is leaning more towards getting the surgery  3/10 hip  Pt is considering SIMON left as bothered her past couple days  Very difficult doing car transfers with limited hip mobility   Pt reports she is afraid she will fall  RT shoulder >Lt 5/10  Pt feels her legs are tired today   Precautions: Back pain, HTN, GERD, Osteopenia hip, normal spine-takes Fosamax,O/A B shoulders,hips/ pain, mm tear RT knee,pre diabetic,depressive disorder, memory difficulty ST, CHARLEEN, balance disorder    Surgical History:  '03 breast cancer mastectomy bilat(cancer free per pt)and cholecystectomy        Pain:   Start of session: 3/10 left hip/knee     OBJECTIVE:    Less ER and abduction hip left in hook vs RT     Treatments:  Therapeutic Exercise: (  38   minutes)  Nu-step B LE level 1/4  min, legs only due to shoulder pain if arms used-DNP today  Standing heel  raises 3x12  Standing calf stretch slant board 3 x 20 sec  Side stepping bar x 2, peach band added x4  no hands  March 2x15 step tap no UE support    Seated:  LAQ shorter range RT/LT  3x10/2#  Isometric Ball hip adduction 3x10 , 2 inch block under feet   DF 2x14 ea    Knee flexion Heel slides 2x12 ea, isometric hams x5/5 sec hold   Knee dangles   Supine:  Mini bridge 2x15  -ankle pumps contra leg flexed x15 LT/RT  -hip bent knee fall out x5/5 RT PT assist ,left very gentle range   -gentle PT assist SLR ham stretch x3 15 sec   -hook small rock LTR x20  -hip abd hook isometric against peach band 2 x10 3 sec   -hook TVA 2x10 3 sec     Manual Therapy: ( minutes)    Gait Training: ( minutes)    Neuromuscular Re-education: (   minutes) defer today pt tired in legs   DLS 1 EC 30 sec ea on foam   Stagger stance EO x30 ea   DLS side/side wt shift, front/back x20 on foam  High knee walk next to rail x4    Therapeutic Activity: ( minutes)  Discussion re sock and aide and reacher's, stretch shoe laces to help with dressing, pacing techniques for laundry      HEP:  Review hep/demo:daily   Standing heel and toe raises x10  Side stepping at counter x4  LAQ shorter range RT, Lt full x10  Seated Ball add x10       ASSESSMENT:   See STG achievement below  RT hip has better rom vs left and less pain.    RT hip is painful with more unilateral wt bearing    Pt is getting stronger pain remains about the same in hip, improved knee.    Pt more tired today so had to limited some activities  Deferred re eval to next session as pt tired today   Post session pain: less pain left hip      PLAN:  Check long term goals and rescore WOMAC and repeat TUG  Pt is seeing doctor next week see what plan is going forward  OP PT PLAN:  Treatment/Interventions: Cryotherapy , Education/Instruction , Gait training , Manual Therapy  , Neuromuscular re-education , Self care/home management , Therapeutic activities , and Therapeutic exercise    PT Plan: Skilled PT   PT Frequency: 2 times per week   Duration:8  Certification Period Start Date: 12/31/24  Certification Period End Date: 3/31/24  Visits Approved: 16  Rehab Potential: Fair  Plan of Care Agreement: Patient         Goals:   STG 8  Pt Jing with HEP>>met  Decrease  pain 25% in hips and knees>>not changed hip, knee improving   Increase rom of knees and hips 5 deg >>LT knee and hip, RT not improving and limited secondary to pain   LTG 16  Increase LE strength 1 MMT to allow pt to perform a 6 inch step without needing UE support-pt goal  Wom[place] scores will improve 10 points currently 45/96   Balance pt will be able to tandem or NBOS stand for 10 sec to help with fall prevention  TUG scores will improve 1 sec, currently 14.57 sec, reducing fall risk

## 2025-02-19 ENCOUNTER — OFFICE VISIT (OUTPATIENT)
Dept: ORTHOPEDIC SURGERY | Facility: CLINIC | Age: 75
End: 2025-02-19
Payer: MEDICARE

## 2025-02-19 DIAGNOSIS — M16.0 BILATERAL PRIMARY OSTEOARTHRITIS OF HIP: Primary | ICD-10-CM

## 2025-02-19 DIAGNOSIS — M17.11 PRIMARY OSTEOARTHRITIS OF RIGHT KNEE: ICD-10-CM

## 2025-02-19 PROCEDURE — 99213 OFFICE O/P EST LOW 20 MIN: CPT | Performed by: STUDENT IN AN ORGANIZED HEALTH CARE EDUCATION/TRAINING PROGRAM

## 2025-02-19 PROCEDURE — 1159F MED LIST DOCD IN RCRD: CPT | Performed by: STUDENT IN AN ORGANIZED HEALTH CARE EDUCATION/TRAINING PROGRAM

## 2025-02-19 PROCEDURE — 1036F TOBACCO NON-USER: CPT | Performed by: STUDENT IN AN ORGANIZED HEALTH CARE EDUCATION/TRAINING PROGRAM

## 2025-02-19 PROCEDURE — 1123F ACP DISCUSS/DSCN MKR DOCD: CPT | Performed by: STUDENT IN AN ORGANIZED HEALTH CARE EDUCATION/TRAINING PROGRAM

## 2025-02-24 ENCOUNTER — TREATMENT (OUTPATIENT)
Dept: PHYSICAL THERAPY | Facility: CLINIC | Age: 75
End: 2025-02-24
Payer: MEDICARE

## 2025-02-24 DIAGNOSIS — M25.561 CHRONIC PAIN OF RIGHT KNEE: ICD-10-CM

## 2025-02-24 DIAGNOSIS — G89.29 CHRONIC PAIN OF RIGHT KNEE: ICD-10-CM

## 2025-02-24 DIAGNOSIS — M25.551 BILATERAL HIP PAIN: Primary | ICD-10-CM

## 2025-02-24 DIAGNOSIS — R26.89 LOSS OF BALANCE: ICD-10-CM

## 2025-02-24 DIAGNOSIS — M25.552 BILATERAL HIP PAIN: Primary | ICD-10-CM

## 2025-02-24 DIAGNOSIS — M25.561 RIGHT KNEE PAIN, UNSPECIFIED CHRONICITY: ICD-10-CM

## 2025-02-24 PROCEDURE — 97110 THERAPEUTIC EXERCISES: CPT | Mod: GP | Performed by: PHYSICAL THERAPIST

## 2025-02-24 NOTE — PROGRESS NOTES
Physical Therapy    Physical Therapy Treatment    Patient Name: Mackenzie Baker  MRN: [id number]  Encounter Date: 2/24/2025     Time Calculation  Start Time: 1015  Stop Time: 1059  Time Calculation (min): 44 min    Visit #: 10  out of 16  Insurance:   Cert sent 12/31/24, signed 1/6, thru 3/31/25  MEDICARE A/B - NO AUTH / $240 DEDUCT MET / 80% Coins / [place] visits / $972.90 used 2024 PT/ST.   MMO [facility] SUPP ACTIVE / PER RTE, Availity [phone] / ds    Visits Approved: [place]  Evaluation date: 12/31/24      Current Problem:   1. Bilateral hip pain  Follow Up In Physical Therapy      2. Chronic pain of right knee        3. Right knee pain, unspecified chronicity  Follow Up In Physical Therapy      4. Loss of balance  Follow Up In Physical Therapy          SUBJECTIVE:   Ok after her last session  Folloow up with her hip/knee MD he is not going to do surgery as her has decreased, tough she is a candidate for it  if sx would worsen  Pt is seeing Anirudh 3/5-RT shoulder   3/10 hip  Pt is considering SIMON left as bothered her past couple days  Very difficult doing car transfers with limited hip mobility   Pt reports she is afraid she will fall  RT shoulder >Lt 5/10  Pt feels her legs are tired today     Precautions: Back pain, HTN, GERD, Osteopenia hip, normal spine-takes Fosamax,O/A B shoulders,hips/ pain, mm tear RT knee,pre diabetic,depressive disorder, memory difficulty ST, CHARLEEN, balance disorder    Recent MD visit 2/19:    XR right hip/ pelvis  advanced osteoarthritis of the left hip.  She also has severe arthritis of right hip but is not as advanced as the left hip.  No evidence of fracture or dislocation.  right knee x-rays was  moderate degenerative changes of the right knee with varus limb alignment. There is joint space narrowing, subchondral sclerosis, and osteophyte formation. No evidence of fracture, AVN, dislocation, osteomyelitis.       Surgical History:  '03 breast cancer mastectomy bilat(cancer free per pt)and  cholecystectomy        Pain:   Start of session: 0/10 left hip/knee RT/LT     OBJECTIVE:    WOMAC 44/96 45.8%-can't get socks on off can't bend uses aide for left  leg, can do right, difficulty stairs, rising from bed   ROM: 3-116, LT 5-115  Less ER and abduction hip left in hook vs RT     Treatments:  Therapeutic Exercise: (  44 minutes)  Nu-step B LE level 1/4  min, legs only due to shoulder pain if arms used-DNP today  Standing heel  raises 3x12  Standing calf stretch slant board 3 x 20 sec  Side stepping bar  peach band AK added x6  no hands  March 2x15 step tap no UE support 4 inch no hands    Seated:  LAQ shorter range RT/LT  3x12/2#  Isometric Ball hip adduction 3x12 , 2 inch block under feet   DF 2x15 ea   Knee flexion Heel slides 2x12 ea, isometric hams x5/5 sec hold   Knee dangles   TUG x2 plus UE  Supine:  Mini bridge 3x10  -ankle pumps contra leg flexed x15 LT/RT  -hip bent knee fall out x6/5 RT PT assist ,left very gentle range   -gentle PT assist SLR ham stretch x3 15 sec   -hook small rock LTR x20  -hip abd hook isometric against peach band 2 x13 3 sec   -hook TVA 2x10 3 sec     Manual Therapy: ( minutes)    Gait Training: ( minutes)    Neuromuscular Re-education: (   minutes) defer today   DLS 1 EC 30 sec ea on foam   Stagger stance EO x30 ea   DLS side/side wt shift, front/back x20 on foam  High knee walk next to rail x4    Therapeutic Activity: ( minutes)  Discussion re sock and aide and reacher's, stretch shoe laces to help with dressing, pacing techniques for laundry      HEP:  Review hep/demo:daily   Standing heel and toe raises x10  Side stepping at counter x4  LAQ shorter range RT, Lt full x10  Seated Ball add x10 2-3 sets   seated heel slides 2xx10  Mini bridge 3x10 3x a week  Isometrics into couch for knee flexion x5/5 sec hold  ASSESSMENT:   See STG/LTG achievement below  RT hip has better rom vs left and less pain.    Pt is getting stronger hip and knee and better knee motion   TUG score  improved form 15 to 10.23 seconds   Pt still has functional impairments with dressing, stairs  Post session pain: less pain left hip      PLAN:  Go over hep  issue handouts as needed   Pt has one visit remaining and possible discharge   OP PT PLAN:  Treatment/Interventions: Cryotherapy , Education/Instruction , Gait training , Manual Therapy  , Neuromuscular re-education , Self care/home management , Therapeutic activities , and Therapeutic exercise    PT Plan: Skilled PT   PT Frequency: 2 times per week   Duration:8  Certification Period Start Date: 12/31/24  Certification Period End Date: 3/31/24  Visits Approved: 16  Rehab Potential: Fair  Plan of Care Agreement: Patient         Goals:   STG 8  Pt Jing with HEP>>met  Decrease pain 25% in hips and knees>>met  Increase rom of knees and hips 5 deg >>RT/LT knee MET, LT not improving and limited secondary to pain hip left  LTG 16  Increase LE strength 1 MMT to allow pt to perform a 6 inch step without needing UE support-pt goal>>pt reports being able to do if rail present   [place]om[place] scores will improve 10 points currently 45/96 >>>not met improve 1 point 44/96-sx more limited left hip   Balance pt will be able to tandem or NBOS stand for 10 sec to help with fall prevention  TUG scores will improve 1 sec, currently 14.57 sec, reducing fall risk >>>10.23 today MET

## 2025-02-26 ENCOUNTER — TREATMENT (OUTPATIENT)
Dept: PHYSICAL THERAPY | Facility: CLINIC | Age: 75
End: 2025-02-26
Payer: MEDICARE

## 2025-02-26 DIAGNOSIS — M25.561 RIGHT KNEE PAIN, UNSPECIFIED CHRONICITY: ICD-10-CM

## 2025-02-26 DIAGNOSIS — G89.29 CHRONIC PAIN OF RIGHT KNEE: Primary | ICD-10-CM

## 2025-02-26 DIAGNOSIS — M25.552 BILATERAL HIP PAIN: ICD-10-CM

## 2025-02-26 DIAGNOSIS — R26.89 LOSS OF BALANCE: ICD-10-CM

## 2025-02-26 DIAGNOSIS — M25.551 BILATERAL HIP PAIN: ICD-10-CM

## 2025-02-26 DIAGNOSIS — M25.561 CHRONIC PAIN OF RIGHT KNEE: Primary | ICD-10-CM

## 2025-02-26 PROCEDURE — 97110 THERAPEUTIC EXERCISES: CPT | Mod: GP | Performed by: PHYSICAL THERAPIST

## 2025-02-26 NOTE — PROGRESS NOTES
Physical Therapy    Physical Therapy Treatment and DISCHARGE    Patient Name: Mackenzie Baker  MRN: [id number]  Encounter Date: 2/26/2025     Time Calculation  Start Time: 1000  Stop Time: 1040  Time Calculation (min): 40 min    Visit #: 11  out of 16  Insurance:   Cert sent 12/31/24, signed 1/6, thru 3/31/25  MEDICARE A/B - NO AUTH / $240 DEDUCT MET / 80% Coins / [place] visits / $972.90 used 2024 PT/ST.   MMO [facility] SUPP ACTIVE / PER RTE, Availity [phone] / ds    Visits Approved: [place]  Evaluation date: 12/31/24      Current Problem:   1. Chronic pain of right knee        2. Right knee pain, unspecified chronicity  Follow Up In Physical Therapy      3. Bilateral hip pain  Follow Up In Physical Therapy      4. Loss of balance  Follow Up In Physical Therapy          SUBJECTIVE:   Left hip was sore her last session but lessened by next day  Folloow up with her hip/knee MD he is not going to do surgery as her has decreased, tough she is a candidate for it  if sx would worsen  Pt is seeing Anirudh 3/5-RT shoulder   0/10 hip left, present when she woke up but better now  Pt is considering SIMON left as bothered her past couple days  Very difficult doing car transfers with limited hip mobility   Pt reports she is afraid she will fall  RT shoulder >Lt 5/10  Reports calf cramp last night  Precautions: Back pain, HTN, GERD, Osteopenia hip, normal spine-takes Fosamax,O/A B shoulders,hips/ pain, mm tear RT knee,pre diabetic,depressive disorder, memory difficulty ST, CHARLEEN, balance disorder    Recent MD visit 2/19:    XR right hip/ pelvis  advanced osteoarthritis of the left hip.  She also has severe arthritis of right hip but is not as advanced as the left hip.  No evidence of fracture or dislocation.  right knee x-rays was  moderate degenerative changes of the right knee with varus limb alignment. There is joint space narrowing, subchondral sclerosis, and osteophyte formation. No evidence of fracture, AVN, dislocation, osteomyelitis.        Surgical History:  '03 breast cancer mastectomy bilat(cancer free per pt)and cholecystectomy        Pain:   Start of session: 0/10 left hip/knee RT/LT     OBJECTIVE:    Gait; no assistive device decreased hip ext on left   Past  WOMAC 44/96 45.8%-can't get socks on off can't bend uses aide for left  leg, can do right, difficulty stairs, rising from bed   ROM: 3-116, LT 5-115 knee  Less ER and abduction hip left in hook vs RT     Treatments:  Therapeutic Exercise: ( 37  minutes)  Nu-step B LE level 1/4  min, legs only due to shoulder pain if arms used  Standing heel  raises 3x10  Standing calf stretch slant board 3 x 20 sec  Side stepping bar  x6  no hands no band around knees today  March 2x10 step tap no UE support 4 inch no hands    Seated:  LAQ  RT/LT  2x10/2.5#  Isometric Ball hip adduction 2x10 , 2 inch block under feet   DF 2x15 ea   Knee flexion Heel slides x15 ea   Hamstring resistance peach x15 ea     Knee dangles     Supine:  Mini bridge 3x10 deferred left hip pain, gluteal sets instead 2x10  -ankle pumps contra leg flexed x15 LT/RT  -hip bent knee fall out x10/5 RT PT assist ,left very gentle range   -gentle PT assist SLR ham stretch x3 15 sec DNP today  -hook small rock LTR x20  -hip abd hook isometric against peach band 2 x13 3 sec   -hook TVA 2x10 3 sec     Manual Therapy: ( minutes)    Gait Training: ( minutes)    Neuromuscular Re-education: (  2 minutes)     Stagger stance 1 eye covered  x30 ea   DLS side/side wt shift, front/back x20 on foam      Therapeutic Activity: ( minutes)  Discussion re sock and aide and reacher's, stretch shoe laces to help with dressing, pacing techniques for laundry      HEP:  Review hep/demo:daily pt declined hand outs  Standing heel and toe raises 2x10  Side stepping at counter x4  LAQ shorter range RT, Lt full 3x10  Seated Ball add x10 2-3 sets   seated heel slides 2x10  Mini bridge 3x10 3x a week or gluteal sets   Isometrics into couch for knee flexion x5/5 sec  hold  Gentle hip fall out assist on left, right x10 a few second hold     ASSESSMENT:   See STG/LTG achievement below  Decreased program today with pt having some increased sx last visit  RT hip has better rom vs left and less pain.    Pt is getting stronger hip and knee and better knee motion   TUG score improved form 15 to 10.23 seconds   Pt still has functional impairments with dressing, stairs  Pt is happy with status and wants to continue at home, discussed doing left hip rom or isometrics as tolerated    Post session pain: slight groin sx left      PLAN:discharge to hep     Goals:   STG 8  Pt Jing with HEP>>met  Decrease pain 25% in hips and knees>>met  Increase rom of knees and hips 5 deg >>RT/LT knee MET, LT not improving and limited secondary to pain hip left  LTG 16  Increase LE strength 1 MMT to allow pt to perform a 6 inch step without needing UE support-pt goal>>pt reports being able to do if rail present   [place] scores will improve 10 points currently 45/96 >>>not met improve 1 point 44/96-sx more limited left hip   Balance pt will be able to tandem or NBOS stand for 10 sec to help with fall prevention  TUG scores will improve 1 sec, currently 14.57 sec, reducing fall risk >>>10.23 today MET

## 2025-03-29 DIAGNOSIS — I10 BENIGN ESSENTIAL HYPERTENSION: ICD-10-CM

## 2025-03-31 RX ORDER — OLANZAPINE 10 MG/1
10 TABLET ORAL NIGHTLY
Qty: 90 TABLET | Refills: 3 | Status: SHIPPED | OUTPATIENT
Start: 2025-03-31 | End: 2025-04-04 | Stop reason: SDUPTHER

## 2025-04-02 NOTE — PROGRESS NOTES
"Subjective   Chief Complaint   Patient presents with    Follow-up     Patient is coming in today with questions on a hip surgery she is supposed to have. She wants to dicuss questions she has about the doctor that is doing the surgery.        Patient ID: Mackenzie Baker is a 74 y.o. female who presents for Follow-up (Patient is coming in today with questions on a hip surgery she is supposed to have. She wants to dicuss questions she has about the doctor that is doing the surgery. ).    HPI  Mackenzie presents today to discuss a few concerns   LOV: 10/16/2024    Pt is here today to ask my opinion about if she should have hip surgery or try a hip injection as she had good success with injection in her shoulder      Pt has advanced osteoarthritis of the left hip, severe osteoarthritis of the right hip and moderate osteoarthritis of the right knee.   She is seeing Dr. Pedro; LOV Feb 2025  He is planning on conservative measures as of late and requested she return to the office in 10 months with repeat radiographs of bilateral hips as well as her right knee     Pt is ambulating with use of cane     Pt feels she would like to try hp injection at this time     Allergies  Allergies   Allergen Reactions    Penicillin V Hives    Chlorthalidone Other     nausea, vomiting    Gabapentin Other     Loss of muscle control    Hydrochlorothiazide Other     confusion/loss of memory    Hydrocodone-Acetaminophen Other    Lisinopril Other    Lorazepam Other     Angry, agressiveness    Losartan Other     brain felt scrambled    Metformin Other and Unknown    Metoprolol Other     dizzy, faint, and scrambled my brain    Penicillin Hives    Penicillin G Hives    Penicillins Hives    Propoxyphene Hives and Other       Review of Systems  ROS was completed and all systems are negative with the exception of what was noted in the the HPI.       Objective     Last Recorded Vitals   Pulse 87   Ht 1.6 m (5' 3\")   Wt 69.9 kg (154 lb)   SpO2 95% "   BMI 27.28 kg/m²      Medications  Current Outpatient Medications   Medication Instructions    alendronate (FOSAMAX) 70 mg, oral, Every 7 days    amLODIPine (NORVASC) 5 mg, oral, Daily    buPROPion XL (WELLBUTRIN XL) 300 mg, oral, Daily    calcium carbonate-vitamin D3 (Oscal-500) 500 mg-10 mcg (400 unit) tablet 1 tablet, 2 times daily    clonazePAM (KLONOPIN) 1 mg, Daily RT    cloNIDine (CATAPRES) 0.1 mg, oral, Daily    diclofenac sodium (ARTHRITIS PAIN (DICLOFENAC)) 2 g, Topical, 4 times daily PRN    furosemide (LASIX) 40 mg, oral, Daily    multivitamin tablet 1 tablet, Daily RT    naloxone (NARCAN) 4 mg, nasal, As needed, May repeat every 2-3 minutes if needed, alternating nostrils, until medical assistance becomes available.    OLANZapine (ZYPREXA) 10 mg, oral, Nightly    omeprazole (PRILOSEC) 20 mg, oral, Daily before breakfast    spironolactone (ALDACTONE) 25 mg, oral, Daily    traMADol (ULTRAM) 50 mg, oral, Daily PRN    triamcinolone (Kenalog) 0.1 % cream 2 times daily    valACYclovir (Valtrex) 1 gram tablet 1 tablet, 3 times daily       Past Surgical History:   Procedure Laterality Date    OTHER SURGICAL HISTORY  05/31/2022    Mastectomy       Physical Exam   Physical Exam  Pulmonary:      Effort: Pulmonary effort is normal.   Neurological:      Mental Status: She is alert and oriented to person, place, and time.   Psychiatric:         Mood and Affect: Mood normal.         Thought Content: Thought content normal.         Judgment: Judgment normal.           Assessment & Plan  Encounter to discuss test results  Reviewed ortho note and xray results with pt during OV today        Benign essential hypertension  Pt requested med refill today   Orders:    OLANZapine (ZyPREXA) 10 mg tablet; Take 1 tablet (10 mg) by mouth once daily at bedtime.

## 2025-04-04 ENCOUNTER — OFFICE VISIT (OUTPATIENT)
Dept: PRIMARY CARE | Facility: CLINIC | Age: 75
End: 2025-04-04
Payer: MEDICARE

## 2025-04-04 VITALS — HEART RATE: 87 BPM | HEIGHT: 63 IN | BODY MASS INDEX: 27.29 KG/M2 | WEIGHT: 154 LBS | OXYGEN SATURATION: 95 %

## 2025-04-04 DIAGNOSIS — Z71.2 ENCOUNTER TO DISCUSS TEST RESULTS: Primary | ICD-10-CM

## 2025-04-04 DIAGNOSIS — I10 BENIGN ESSENTIAL HYPERTENSION: ICD-10-CM

## 2025-04-04 PROCEDURE — 99212 OFFICE O/P EST SF 10 MIN: CPT | Performed by: NURSE PRACTITIONER

## 2025-04-04 PROCEDURE — 1159F MED LIST DOCD IN RCRD: CPT | Performed by: NURSE PRACTITIONER

## 2025-04-04 PROCEDURE — 1123F ACP DISCUSS/DSCN MKR DOCD: CPT | Performed by: NURSE PRACTITIONER

## 2025-04-04 PROCEDURE — G2211 COMPLEX E/M VISIT ADD ON: HCPCS | Performed by: NURSE PRACTITIONER

## 2025-04-04 PROCEDURE — 1160F RVW MEDS BY RX/DR IN RCRD: CPT | Performed by: NURSE PRACTITIONER

## 2025-04-04 PROCEDURE — 1036F TOBACCO NON-USER: CPT | Performed by: NURSE PRACTITIONER

## 2025-04-04 RX ORDER — OLANZAPINE 10 MG/1
10 TABLET ORAL NIGHTLY
Qty: 90 TABLET | Refills: 3 | Status: SHIPPED | OUTPATIENT
Start: 2025-04-04

## 2025-04-04 ASSESSMENT — PATIENT HEALTH QUESTIONNAIRE - PHQ9
SUM OF ALL RESPONSES TO PHQ9 QUESTIONS 1 AND 2: 0
2. FEELING DOWN, DEPRESSED OR HOPELESS: NOT AT ALL
1. LITTLE INTEREST OR PLEASURE IN DOING THINGS: NOT AT ALL

## 2025-04-30 ENCOUNTER — APPOINTMENT (OUTPATIENT)
Dept: ORTHOPEDIC SURGERY | Facility: CLINIC | Age: 75
End: 2025-04-30
Payer: MEDICARE

## 2025-05-29 DIAGNOSIS — M81.0 OSTEOPOROSIS WITHOUT CURRENT PATHOLOGICAL FRACTURE, UNSPECIFIED OSTEOPOROSIS TYPE: ICD-10-CM

## 2025-05-29 RX ORDER — ALENDRONATE SODIUM 70 MG/1
70 TABLET ORAL
Qty: 12 TABLET | Refills: 3 | Status: SHIPPED | OUTPATIENT
Start: 2025-05-29

## 2025-07-24 ENCOUNTER — APPOINTMENT (OUTPATIENT)
Dept: PRIMARY CARE | Facility: CLINIC | Age: 75
End: 2025-07-24
Payer: MEDICARE

## 2025-08-20 ENCOUNTER — HOSPITAL ENCOUNTER (OUTPATIENT)
Dept: RADIOLOGY | Facility: CLINIC | Age: 75
Discharge: HOME | End: 2025-08-20
Payer: MEDICARE

## 2025-08-20 ENCOUNTER — OFFICE VISIT (OUTPATIENT)
Dept: ORTHOPEDIC SURGERY | Facility: CLINIC | Age: 75
End: 2025-08-20
Payer: MEDICARE

## 2025-08-20 DIAGNOSIS — M17.11 PRIMARY OSTEOARTHRITIS OF RIGHT KNEE: ICD-10-CM

## 2025-08-20 DIAGNOSIS — M16.0 PRIMARY LOCALIZED OSTEOARTHRITIS OF HIPS, BILATERAL: Primary | ICD-10-CM

## 2025-08-20 DIAGNOSIS — M16.0 PRIMARY LOCALIZED OSTEOARTHRITIS OF HIPS, BILATERAL: ICD-10-CM

## 2025-08-20 PROCEDURE — 1159F MED LIST DOCD IN RCRD: CPT

## 2025-08-20 PROCEDURE — 73564 X-RAY EXAM KNEE 4 OR MORE: CPT | Mod: RT

## 2025-08-20 PROCEDURE — 73521 X-RAY EXAM HIPS BI 2 VIEWS: CPT | Mod: BILATERAL PROCEDURE | Performed by: RADIOLOGY

## 2025-08-20 PROCEDURE — 99214 OFFICE O/P EST MOD 30 MIN: CPT

## 2025-08-20 PROCEDURE — 99212 OFFICE O/P EST SF 10 MIN: CPT

## 2025-08-20 PROCEDURE — 1036F TOBACCO NON-USER: CPT

## 2025-08-20 PROCEDURE — 73521 X-RAY EXAM HIPS BI 2 VIEWS: CPT

## 2025-08-22 DIAGNOSIS — M16.12 PRIMARY OSTEOARTHRITIS OF LEFT HIP: ICD-10-CM

## 2025-08-27 ENCOUNTER — APPOINTMENT (OUTPATIENT)
Dept: ORTHOPEDIC SURGERY | Facility: HOSPITAL | Age: 75
End: 2025-08-27
Payer: MEDICARE

## 2025-08-27 ENCOUNTER — HOSPITAL ENCOUNTER (OUTPATIENT)
Dept: RADIOLOGY | Facility: CLINIC | Age: 75
Discharge: HOME | End: 2025-08-27
Payer: MEDICARE

## 2025-08-27 ENCOUNTER — APPOINTMENT (OUTPATIENT)
Dept: PREADMISSION TESTING | Facility: HOSPITAL | Age: 75
End: 2025-08-27
Payer: MEDICARE

## 2025-08-27 ENCOUNTER — APPOINTMENT (OUTPATIENT)
Dept: ORTHOPEDIC SURGERY | Facility: CLINIC | Age: 75
End: 2025-08-27
Payer: MEDICARE

## 2025-08-27 DIAGNOSIS — M16.12 PRIMARY LOCALIZED OSTEOARTHRITIS OF LEFT HIP: ICD-10-CM

## 2025-08-27 PROCEDURE — 73502 X-RAY EXAM HIP UNI 2-3 VIEWS: CPT | Mod: LEFT SIDE | Performed by: STUDENT IN AN ORGANIZED HEALTH CARE EDUCATION/TRAINING PROGRAM

## 2025-08-27 PROCEDURE — 99214 OFFICE O/P EST MOD 30 MIN: CPT | Performed by: STUDENT IN AN ORGANIZED HEALTH CARE EDUCATION/TRAINING PROGRAM

## 2025-08-27 PROCEDURE — 1159F MED LIST DOCD IN RCRD: CPT | Performed by: STUDENT IN AN ORGANIZED HEALTH CARE EDUCATION/TRAINING PROGRAM

## 2025-08-27 PROCEDURE — 99202 OFFICE O/P NEW SF 15 MIN: CPT

## 2025-08-27 PROCEDURE — 1036F TOBACCO NON-USER: CPT | Performed by: STUDENT IN AN ORGANIZED HEALTH CARE EDUCATION/TRAINING PROGRAM

## 2025-08-27 PROCEDURE — 73502 X-RAY EXAM HIP UNI 2-3 VIEWS: CPT | Mod: LT

## 2025-08-29 ENCOUNTER — APPOINTMENT (OUTPATIENT)
Dept: ORTHOPEDIC SURGERY | Facility: HOSPITAL | Age: 75
End: 2025-08-29
Payer: MEDICARE

## 2025-08-29 ENCOUNTER — EDUCATION (OUTPATIENT)
Dept: ORTHOPEDIC SURGERY | Facility: HOSPITAL | Age: 75
End: 2025-08-29
Payer: MEDICARE

## 2025-09-02 ENCOUNTER — LAB (OUTPATIENT)
Dept: LAB | Facility: HOSPITAL | Age: 75
End: 2025-09-02
Payer: MEDICARE

## 2025-09-02 ENCOUNTER — PRE-ADMISSION TESTING (OUTPATIENT)
Dept: PREADMISSION TESTING | Facility: HOSPITAL | Age: 75
End: 2025-09-02
Payer: MEDICARE

## 2025-09-02 VITALS
BODY MASS INDEX: 29.28 KG/M2 | TEMPERATURE: 97.9 F | HEART RATE: 84 BPM | HEIGHT: 61 IN | OXYGEN SATURATION: 98 % | WEIGHT: 155.1 LBS | DIASTOLIC BLOOD PRESSURE: 81 MMHG | SYSTOLIC BLOOD PRESSURE: 135 MMHG | RESPIRATION RATE: 18 BRPM

## 2025-09-02 DIAGNOSIS — Z01.818 ENCOUNTER FOR OTHER PREPROCEDURAL EXAMINATION: Primary | ICD-10-CM

## 2025-09-02 DIAGNOSIS — R79.9 ABNORMAL FINDING OF BLOOD CHEMISTRY, UNSPECIFIED: ICD-10-CM

## 2025-09-02 DIAGNOSIS — Z01.818 PREOPERATIVE TESTING: Primary | ICD-10-CM

## 2025-09-02 DIAGNOSIS — M16.12 PRIMARY LOCALIZED OSTEOARTHRITIS OF LEFT HIP: Primary | ICD-10-CM

## 2025-09-02 LAB
ALBUMIN SERPL BCP-MCNC: 4.5 G/DL (ref 3.4–5)
ALP SERPL-CCNC: 79 U/L (ref 33–136)
ALT SERPL W P-5'-P-CCNC: 18 U/L (ref 7–45)
ANION GAP SERPL CALC-SCNC: 17 MMOL/L (ref 10–20)
AST SERPL W P-5'-P-CCNC: 18 U/L (ref 9–39)
BASOPHILS # BLD AUTO: 0.06 X10*3/UL (ref 0–0.1)
BASOPHILS NFR BLD AUTO: 0.7 %
BILIRUB SERPL-MCNC: 0.8 MG/DL (ref 0–1.2)
BUN SERPL-MCNC: 12 MG/DL (ref 6–23)
CALCIUM SERPL-MCNC: 9.8 MG/DL (ref 8.6–10.3)
CHLORIDE SERPL-SCNC: 96 MMOL/L (ref 98–107)
CO2 SERPL-SCNC: 24 MMOL/L (ref 21–32)
CREAT SERPL-MCNC: 0.83 MG/DL (ref 0.5–1.05)
EGFRCR SERPLBLD CKD-EPI 2021: 73 ML/MIN/1.73M*2
EOSINOPHIL # BLD AUTO: 0.12 X10*3/UL (ref 0–0.4)
EOSINOPHIL NFR BLD AUTO: 1.4 %
ERYTHROCYTE [DISTWIDTH] IN BLOOD BY AUTOMATED COUNT: 14.9 % (ref 11.5–14.5)
GLUCOSE SERPL-MCNC: 93 MG/DL (ref 74–99)
HCT VFR BLD AUTO: 38 % (ref 36–46)
HGB BLD-MCNC: 12.5 G/DL (ref 12–16)
IMM GRANULOCYTES # BLD AUTO: 0.04 X10*3/UL (ref 0–0.5)
IMM GRANULOCYTES NFR BLD AUTO: 0.5 % (ref 0–0.9)
LYMPHOCYTES # BLD AUTO: 0.54 X10*3/UL (ref 0.8–3)
LYMPHOCYTES NFR BLD AUTO: 6.3 %
MCH RBC QN AUTO: 26.4 PG (ref 26–34)
MCHC RBC AUTO-ENTMCNC: 32.9 G/DL (ref 32–36)
MCV RBC AUTO: 80 FL (ref 80–100)
MONOCYTES # BLD AUTO: 0.79 X10*3/UL (ref 0.05–0.8)
MONOCYTES NFR BLD AUTO: 9.3 %
NEUTROPHILS # BLD AUTO: 6.98 X10*3/UL (ref 1.6–5.5)
NEUTROPHILS NFR BLD AUTO: 81.8 %
PLATELET # BLD AUTO: 432 X10*3/UL (ref 150–450)
POTASSIUM SERPL-SCNC: 4.7 MMOL/L (ref 3.5–5.3)
PROT SERPL-MCNC: 7.1 G/DL (ref 6.4–8.2)
RBC # BLD AUTO: 4.73 X10*6/UL (ref 4–5.2)
SODIUM SERPL-SCNC: 132 MMOL/L (ref 136–145)
WBC # BLD AUTO: 8.5 X10*3/UL (ref 4.4–11.3)

## 2025-09-02 PROCEDURE — 93005 ELECTROCARDIOGRAM TRACING: CPT

## 2025-09-02 PROCEDURE — 93010 ELECTROCARDIOGRAM REPORT: CPT | Performed by: INTERNAL MEDICINE

## 2025-09-02 PROCEDURE — 36415 COLL VENOUS BLD VENIPUNCTURE: CPT

## 2025-09-02 PROCEDURE — 80053 COMPREHEN METABOLIC PANEL: CPT

## 2025-09-02 PROCEDURE — 83036 HEMOGLOBIN GLYCOSYLATED A1C: CPT

## 2025-09-02 PROCEDURE — 87081 CULTURE SCREEN ONLY: CPT | Mod: BEALAB

## 2025-09-02 PROCEDURE — 85025 COMPLETE CBC W/AUTO DIFF WBC: CPT

## 2025-09-02 PROCEDURE — 99204 OFFICE O/P NEW MOD 45 MIN: CPT | Performed by: NURSE PRACTITIONER

## 2025-09-02 RX ORDER — CHLORHEXIDINE GLUCONATE 40 MG/ML
SOLUTION TOPICAL DAILY
Qty: 1000 ML | Refills: 0 | Status: ON HOLD | OUTPATIENT
Start: 2025-09-02 | End: 2025-09-04 | Stop reason: ALTCHOICE

## 2025-09-02 ASSESSMENT — PAIN - FUNCTIONAL ASSESSMENT: PAIN_FUNCTIONAL_ASSESSMENT: 0-10

## 2025-09-02 ASSESSMENT — LIFESTYLE VARIABLES
AUDIT-C TOTAL SCORE: 1
HOW OFTEN DO YOU HAVE SIX OR MORE DRINKS ON ONE OCCASION: NEVER
SKIP TO QUESTIONS 9-10: 1
HOW MANY STANDARD DRINKS CONTAINING ALCOHOL DO YOU HAVE ON A TYPICAL DAY: 1 OR 2
HOW OFTEN DO YOU HAVE A DRINK CONTAINING ALCOHOL: MONTHLY OR LESS
AUDIT-C TOTAL SCORE: 1

## 2025-09-02 ASSESSMENT — PAIN SCALES - GENERAL: PAINLEVEL_OUTOF10: 2

## 2025-09-03 LAB
ATRIAL RATE: 83 BPM
EST. AVERAGE GLUCOSE BLD GHB EST-MCNC: 123 MG/DL
HBA1C MFR BLD: 5.9 % (ref ?–5.7)
P AXIS: 62 DEGREES
P OFFSET: 178 MS
P ONSET: 131 MS
PR INTERVAL: 182 MS
Q ONSET: 222 MS
QRS COUNT: 14 BEATS
QRS DURATION: 80 MS
QT INTERVAL: 342 MS
QTC CALCULATION(BAZETT): 401 MS
QTC FREDERICIA: 381 MS
R AXIS: 39 DEGREES
T AXIS: 55 DEGREES
T OFFSET: 393 MS
VENTRICULAR RATE: 83 BPM

## 2025-09-03 RX ORDER — OXYCODONE HYDROCHLORIDE 5 MG/1
5-10 TABLET ORAL EVERY 6 HOURS PRN
Qty: 40 TABLET | Refills: 0 | Status: SHIPPED | OUTPATIENT
Start: 2025-09-03 | End: 2025-09-11

## 2025-09-03 RX ORDER — TRAMADOL HYDROCHLORIDE 50 MG/1
50-100 TABLET, FILM COATED ORAL EVERY 6 HOURS PRN
Qty: 40 TABLET | Refills: 0 | Status: SHIPPED | OUTPATIENT
Start: 2025-09-03 | End: 2025-09-10

## 2025-09-03 RX ORDER — DOXYCYCLINE 100 MG/1
100 TABLET ORAL 2 TIMES DAILY
Qty: 10 TABLET | Refills: 0 | Status: SHIPPED | OUTPATIENT
Start: 2025-09-03 | End: 2025-09-09

## 2025-09-03 RX ORDER — NAPROXEN SODIUM 220 MG/1
81 TABLET, FILM COATED ORAL 2 TIMES DAILY
Qty: 60 TABLET | Refills: 0 | Status: SHIPPED | OUTPATIENT
Start: 2025-09-03 | End: 2025-10-04

## 2025-09-03 RX ORDER — ONDANSETRON 4 MG/1
4 TABLET, FILM COATED ORAL EVERY 8 HOURS PRN
Qty: 20 TABLET | Refills: 0 | Status: SHIPPED | OUTPATIENT
Start: 2025-09-03

## 2025-09-03 RX ORDER — ACETAMINOPHEN 500 MG
1000 TABLET ORAL EVERY 8 HOURS
Qty: 60 TABLET | Refills: 1 | Status: SHIPPED | OUTPATIENT
Start: 2025-09-03 | End: 2025-09-24

## 2025-09-03 RX ORDER — DOCUSATE SODIUM 100 MG/1
100 CAPSULE, LIQUID FILLED ORAL 2 TIMES DAILY
Qty: 30 CAPSULE | Refills: 0 | Status: SHIPPED | OUTPATIENT
Start: 2025-09-03 | End: 2025-09-19

## 2025-09-03 RX ORDER — SENNOSIDES 8.6 MG/1
1 TABLET ORAL DAILY
Qty: 15 TABLET | Refills: 0 | Status: SHIPPED | OUTPATIENT
Start: 2025-09-03 | End: 2025-09-19

## 2025-09-04 ENCOUNTER — PHARMACY VISIT (OUTPATIENT)
Dept: PHARMACY | Facility: CLINIC | Age: 75
End: 2025-09-04
Payer: COMMERCIAL

## 2025-09-04 ENCOUNTER — ANESTHESIA (OUTPATIENT)
Dept: OPERATING ROOM | Facility: HOSPITAL | Age: 75
End: 2025-09-04
Payer: MEDICARE

## 2025-09-04 ENCOUNTER — DOCUMENTATION (OUTPATIENT)
Dept: HOME HEALTH SERVICES | Facility: HOME HEALTH | Age: 75
End: 2025-09-04
Payer: MEDICARE

## 2025-09-04 ENCOUNTER — APPOINTMENT (OUTPATIENT)
Dept: RADIOLOGY | Facility: HOSPITAL | Age: 75
End: 2025-09-04
Payer: MEDICARE

## 2025-09-04 ENCOUNTER — ANESTHESIA EVENT (OUTPATIENT)
Dept: OPERATING ROOM | Facility: HOSPITAL | Age: 75
End: 2025-09-04
Payer: MEDICARE

## 2025-09-04 ENCOUNTER — HOSPITAL ENCOUNTER (OUTPATIENT)
Facility: HOSPITAL | Age: 75
Discharge: HOME HEALTH | End: 2025-09-05
Attending: STUDENT IN AN ORGANIZED HEALTH CARE EDUCATION/TRAINING PROGRAM | Admitting: STUDENT IN AN ORGANIZED HEALTH CARE EDUCATION/TRAINING PROGRAM
Payer: MEDICARE

## 2025-09-04 ENCOUNTER — HOME HEALTH ADMISSION (OUTPATIENT)
Dept: HOME HEALTH SERVICES | Facility: HOME HEALTH | Age: 75
End: 2025-09-04
Payer: MEDICARE

## 2025-09-04 DIAGNOSIS — Z96.642 S/P TOTAL LEFT HIP ARTHROPLASTY: Primary | ICD-10-CM

## 2025-09-04 DIAGNOSIS — M16.12 PRIMARY OSTEOARTHRITIS OF LEFT HIP: ICD-10-CM

## 2025-09-04 PROBLEM — M16.9 OA (OSTEOARTHRITIS) OF HIP: Status: ACTIVE | Noted: 2025-09-04

## 2025-09-04 LAB — STAPHYLOCOCCUS SPEC CULT: NORMAL

## 2025-09-04 PROCEDURE — 7100000002 HC RECOVERY ROOM TIME - EACH INCREMENTAL 1 MINUTE: Performed by: STUDENT IN AN ORGANIZED HEALTH CARE EDUCATION/TRAINING PROGRAM

## 2025-09-04 PROCEDURE — C1713 ANCHOR/SCREW BN/BN,TIS/BN: HCPCS | Performed by: STUDENT IN AN ORGANIZED HEALTH CARE EDUCATION/TRAINING PROGRAM

## 2025-09-04 PROCEDURE — 2500000004 HC RX 250 GENERAL PHARMACY W/ HCPCS (ALT 636 FOR OP/ED)

## 2025-09-04 PROCEDURE — C1776 JOINT DEVICE (IMPLANTABLE): HCPCS | Performed by: STUDENT IN AN ORGANIZED HEALTH CARE EDUCATION/TRAINING PROGRAM

## 2025-09-04 PROCEDURE — 7100000001 HC RECOVERY ROOM TIME - INITIAL BASE CHARGE: Performed by: STUDENT IN AN ORGANIZED HEALTH CARE EDUCATION/TRAINING PROGRAM

## 2025-09-04 PROCEDURE — 27130 TOTAL HIP ARTHROPLASTY: CPT | Performed by: STUDENT IN AN ORGANIZED HEALTH CARE EDUCATION/TRAINING PROGRAM

## 2025-09-04 PROCEDURE — 99222 1ST HOSP IP/OBS MODERATE 55: CPT | Performed by: EMERGENCY MEDICINE

## 2025-09-04 PROCEDURE — 3600000018 HC OR TIME - INITIAL BASE CHARGE - PROCEDURE LEVEL SIX: Performed by: STUDENT IN AN ORGANIZED HEALTH CARE EDUCATION/TRAINING PROGRAM

## 2025-09-04 PROCEDURE — 72170 X-RAY EXAM OF PELVIS: CPT

## 2025-09-04 PROCEDURE — A4649 SURGICAL SUPPLIES: HCPCS | Performed by: STUDENT IN AN ORGANIZED HEALTH CARE EDUCATION/TRAINING PROGRAM

## 2025-09-04 PROCEDURE — 3600000017 HC OR TIME - EACH INCREMENTAL 1 MINUTE - PROCEDURE LEVEL SIX: Performed by: STUDENT IN AN ORGANIZED HEALTH CARE EDUCATION/TRAINING PROGRAM

## 2025-09-04 PROCEDURE — 2500000004 HC RX 250 GENERAL PHARMACY W/ HCPCS (ALT 636 FOR OP/ED): Performed by: ANESTHESIOLOGY

## 2025-09-04 PROCEDURE — 99100 ANES PT EXTEME AGE<1 YR&>70: CPT | Performed by: ANESTHESIOLOGY

## 2025-09-04 PROCEDURE — 72170 X-RAY EXAM OF PELVIS: CPT | Performed by: RADIOLOGY

## 2025-09-04 PROCEDURE — 7100000011 HC EXTENDED STAY RECOVERY HOURLY - NURSING UNIT

## 2025-09-04 PROCEDURE — 2500000005 HC RX 250 GENERAL PHARMACY W/O HCPCS

## 2025-09-04 PROCEDURE — 2500000004 HC RX 250 GENERAL PHARMACY W/ HCPCS (ALT 636 FOR OP/ED): Performed by: STUDENT IN AN ORGANIZED HEALTH CARE EDUCATION/TRAINING PROGRAM

## 2025-09-04 PROCEDURE — 2780000003 HC OR 278 NO HCPCS: Performed by: STUDENT IN AN ORGANIZED HEALTH CARE EDUCATION/TRAINING PROGRAM

## 2025-09-04 PROCEDURE — 2720000007 HC OR 272 NO HCPCS: Performed by: STUDENT IN AN ORGANIZED HEALTH CARE EDUCATION/TRAINING PROGRAM

## 2025-09-04 PROCEDURE — 97530 THERAPEUTIC ACTIVITIES: CPT | Mod: GP

## 2025-09-04 PROCEDURE — 2500000002 HC RX 250 W HCPCS SELF ADMINISTERED DRUGS (ALT 637 FOR MEDICARE OP, ALT 636 FOR OP/ED)

## 2025-09-04 PROCEDURE — A27130 PR TOTAL HIP ARTHROPLASTY

## 2025-09-04 PROCEDURE — 2500000001 HC RX 250 WO HCPCS SELF ADMINISTERED DRUGS (ALT 637 FOR MEDICARE OP)

## 2025-09-04 PROCEDURE — 2500000004 HC RX 250 GENERAL PHARMACY W/ HCPCS (ALT 636 FOR OP/ED): Performed by: EMERGENCY MEDICINE

## 2025-09-04 PROCEDURE — 97162 PT EVAL MOD COMPLEX 30 MIN: CPT | Mod: GP

## 2025-09-04 PROCEDURE — 3700000002 HC GENERAL ANESTHESIA TIME - EACH INCREMENTAL 1 MINUTE: Performed by: STUDENT IN AN ORGANIZED HEALTH CARE EDUCATION/TRAINING PROGRAM

## 2025-09-04 PROCEDURE — 97110 THERAPEUTIC EXERCISES: CPT | Mod: GP

## 2025-09-04 PROCEDURE — 3700000001 HC GENERAL ANESTHESIA TIME - INITIAL BASE CHARGE: Performed by: STUDENT IN AN ORGANIZED HEALTH CARE EDUCATION/TRAINING PROGRAM

## 2025-09-04 PROCEDURE — A27130 PR TOTAL HIP ARTHROPLASTY: Performed by: ANESTHESIOLOGY

## 2025-09-04 PROCEDURE — 2500000001 HC RX 250 WO HCPCS SELF ADMINISTERED DRUGS (ALT 637 FOR MEDICARE OP): Performed by: EMERGENCY MEDICINE

## 2025-09-04 PROCEDURE — RXMED WILLOW AMBULATORY MEDICATION CHARGE

## 2025-09-04 DEVICE — SCREW CANCELL 6.5 X 15: Type: IMPLANTABLE DEVICE | Site: HIP | Status: FUNCTIONAL

## 2025-09-04 DEVICE — HEAD, FEMORAL,CERAMIC 32+1: Type: IMPLANTABLE DEVICE | Site: HIP | Status: FUNCTIONAL

## 2025-09-04 DEVICE — IMPLANTABLE DEVICE: Type: IMPLANTABLE DEVICE | Site: HIP | Status: FUNCTIONAL

## 2025-09-04 DEVICE — SCREW CANCELLOUS 6.5 X 25: Type: IMPLANTABLE DEVICE | Site: HIP | Status: FUNCTIONAL

## 2025-09-04 RX ORDER — SCOPOLAMINE 1 MG/3D
1 PATCH, EXTENDED RELEASE TRANSDERMAL ONCE
Status: DISCONTINUED | OUTPATIENT
Start: 2025-09-04 | End: 2025-09-05 | Stop reason: HOSPADM

## 2025-09-04 RX ORDER — TRAMADOL HYDROCHLORIDE 50 MG/1
50 TABLET, FILM COATED ORAL EVERY 6 HOURS PRN
Status: DISCONTINUED | OUTPATIENT
Start: 2025-09-04 | End: 2025-09-05 | Stop reason: HOSPADM

## 2025-09-04 RX ORDER — ASPIRIN 81 MG/1
81 TABLET ORAL 2 TIMES DAILY
Status: DISCONTINUED | OUTPATIENT
Start: 2025-09-04 | End: 2025-09-05 | Stop reason: HOSPADM

## 2025-09-04 RX ORDER — DEXMEDETOMIDINE IN 0.9 % NACL 20 MCG/5ML
SYRINGE (ML) INTRAVENOUS AS NEEDED
Status: DISCONTINUED | OUTPATIENT
Start: 2025-09-04 | End: 2025-09-04

## 2025-09-04 RX ORDER — FENTANYL CITRATE 50 UG/ML
25 INJECTION, SOLUTION INTRAMUSCULAR; INTRAVENOUS EVERY 5 MIN PRN
Status: DISCONTINUED | OUTPATIENT
Start: 2025-09-04 | End: 2025-09-04 | Stop reason: HOSPADM

## 2025-09-04 RX ORDER — ACETAMINOPHEN 325 MG/1
975 TABLET ORAL EVERY 8 HOURS
Status: DISCONTINUED | OUTPATIENT
Start: 2025-09-04 | End: 2025-09-05 | Stop reason: HOSPADM

## 2025-09-04 RX ORDER — ONDANSETRON 4 MG/1
4 TABLET, ORALLY DISINTEGRATING ORAL EVERY 8 HOURS PRN
Status: DISCONTINUED | OUTPATIENT
Start: 2025-09-04 | End: 2025-09-05 | Stop reason: HOSPADM

## 2025-09-04 RX ORDER — ORPHENADRINE CITRATE 30 MG/ML
60 INJECTION INTRAMUSCULAR; INTRAVENOUS EVERY 12 HOURS SCHEDULED
Status: DISCONTINUED | OUTPATIENT
Start: 2025-09-04 | End: 2025-09-05 | Stop reason: HOSPADM

## 2025-09-04 RX ORDER — TRIAMCINOLONE ACETONIDE 1 MG/G
1 CREAM TOPICAL 2 TIMES DAILY
Status: DISCONTINUED | OUTPATIENT
Start: 2025-09-04 | End: 2025-09-05 | Stop reason: HOSPADM

## 2025-09-04 RX ORDER — VANCOMYCIN 1.5 G/300ML
1500 INJECTION, SOLUTION INTRAVENOUS ONCE
Status: COMPLETED | OUTPATIENT
Start: 2025-09-04 | End: 2025-09-04

## 2025-09-04 RX ORDER — ONDANSETRON HYDROCHLORIDE 2 MG/ML
4 INJECTION, SOLUTION INTRAVENOUS EVERY 8 HOURS PRN
Status: DISCONTINUED | OUTPATIENT
Start: 2025-09-04 | End: 2025-09-05 | Stop reason: HOSPADM

## 2025-09-04 RX ORDER — SODIUM CHLORIDE, SODIUM LACTATE, POTASSIUM CHLORIDE, CALCIUM CHLORIDE 600; 310; 30; 20 MG/100ML; MG/100ML; MG/100ML; MG/100ML
100 INJECTION, SOLUTION INTRAVENOUS CONTINUOUS
Status: ACTIVE | OUTPATIENT
Start: 2025-09-04 | End: 2025-09-05

## 2025-09-04 RX ORDER — OXYCODONE HYDROCHLORIDE 5 MG/1
5 TABLET ORAL EVERY 4 HOURS PRN
Status: DISCONTINUED | OUTPATIENT
Start: 2025-09-04 | End: 2025-09-04 | Stop reason: HOSPADM

## 2025-09-04 RX ORDER — BISACODYL 5 MG
10 TABLET, DELAYED RELEASE (ENTERIC COATED) ORAL DAILY PRN
Status: DISCONTINUED | OUTPATIENT
Start: 2025-09-04 | End: 2025-09-05 | Stop reason: HOSPADM

## 2025-09-04 RX ORDER — BUPROPION HYDROCHLORIDE 150 MG/1
300 TABLET ORAL DAILY
Status: DISCONTINUED | OUTPATIENT
Start: 2025-09-05 | End: 2025-09-05 | Stop reason: HOSPADM

## 2025-09-04 RX ORDER — SODIUM CHLORIDE, SODIUM LACTATE, POTASSIUM CHLORIDE, CALCIUM CHLORIDE 600; 310; 30; 20 MG/100ML; MG/100ML; MG/100ML; MG/100ML
50 INJECTION, SOLUTION INTRAVENOUS CONTINUOUS
Status: ACTIVE | OUTPATIENT
Start: 2025-09-04 | End: 2025-09-05

## 2025-09-04 RX ORDER — VANCOMYCIN 1 G/200ML
1 INJECTION, SOLUTION INTRAVENOUS EVERY 12 HOURS
Status: COMPLETED | OUTPATIENT
Start: 2025-09-04 | End: 2025-09-04

## 2025-09-04 RX ORDER — AMLODIPINE BESYLATE 5 MG/1
5 TABLET ORAL DAILY
Status: DISCONTINUED | OUTPATIENT
Start: 2025-09-05 | End: 2025-09-05 | Stop reason: HOSPADM

## 2025-09-04 RX ORDER — SPIRONOLACTONE 25 MG/1
25 TABLET ORAL NIGHTLY
Status: DISCONTINUED | OUTPATIENT
Start: 2025-09-04 | End: 2025-09-05 | Stop reason: HOSPADM

## 2025-09-04 RX ORDER — LIDOCAINE HYDROCHLORIDE 10 MG/ML
0.1 INJECTION, SOLUTION EPIDURAL; INFILTRATION; INTRACAUDAL; PERINEURAL ONCE
Status: DISCONTINUED | OUTPATIENT
Start: 2025-09-04 | End: 2025-09-04 | Stop reason: HOSPADM

## 2025-09-04 RX ORDER — FERROUS SULFATE 325(65) MG
65 TABLET ORAL
Status: DISCONTINUED | OUTPATIENT
Start: 2025-09-04 | End: 2025-09-05 | Stop reason: HOSPADM

## 2025-09-04 RX ORDER — NALOXONE HYDROCHLORIDE 0.4 MG/ML
0.2 INJECTION, SOLUTION INTRAMUSCULAR; INTRAVENOUS; SUBCUTANEOUS EVERY 5 MIN PRN
Status: DISCONTINUED | OUTPATIENT
Start: 2025-09-04 | End: 2025-09-05 | Stop reason: HOSPADM

## 2025-09-04 RX ORDER — OLANZAPINE 2.5 MG/1
10 TABLET, FILM COATED ORAL NIGHTLY
Status: DISCONTINUED | OUTPATIENT
Start: 2025-09-04 | End: 2025-09-05 | Stop reason: HOSPADM

## 2025-09-04 RX ORDER — FUROSEMIDE 40 MG/1
40 TABLET ORAL DAILY
Status: DISCONTINUED | OUTPATIENT
Start: 2025-09-05 | End: 2025-09-05 | Stop reason: HOSPADM

## 2025-09-04 RX ORDER — CEFAZOLIN SODIUM 2 G/100ML
2 INJECTION, SOLUTION INTRAVENOUS ONCE
Status: DISCONTINUED | OUTPATIENT
Start: 2025-09-04 | End: 2025-09-04 | Stop reason: ALTCHOICE

## 2025-09-04 RX ORDER — PSYLLIUM HUSK 0.4 G
1 CAPSULE ORAL 2 TIMES DAILY
Status: DISCONTINUED | OUTPATIENT
Start: 2025-09-04 | End: 2025-09-05 | Stop reason: HOSPADM

## 2025-09-04 RX ORDER — CLONAZEPAM 0.5 MG/1
1 TABLET ORAL NIGHTLY
Status: DISCONTINUED | OUTPATIENT
Start: 2025-09-04 | End: 2025-09-05 | Stop reason: HOSPADM

## 2025-09-04 RX ORDER — ROPIVACAINE/EPI/CLONIDINE/KET 2.46-0.005
SYRINGE (ML) INJECTION AS NEEDED
Status: DISCONTINUED | OUTPATIENT
Start: 2025-09-04 | End: 2025-09-04 | Stop reason: HOSPADM

## 2025-09-04 RX ORDER — FENTANYL CITRATE 50 UG/ML
50 INJECTION, SOLUTION INTRAMUSCULAR; INTRAVENOUS EVERY 5 MIN PRN
Status: DISCONTINUED | OUTPATIENT
Start: 2025-09-04 | End: 2025-09-04 | Stop reason: HOSPADM

## 2025-09-04 RX ORDER — ONDANSETRON HYDROCHLORIDE 2 MG/ML
4 INJECTION, SOLUTION INTRAVENOUS ONCE AS NEEDED
Status: DISCONTINUED | OUTPATIENT
Start: 2025-09-04 | End: 2025-09-04 | Stop reason: HOSPADM

## 2025-09-04 RX ORDER — CEFAZOLIN SODIUM 2 G/100ML
2 INJECTION, SOLUTION INTRAVENOUS EVERY 8 HOURS
Status: DISCONTINUED | OUTPATIENT
Start: 2025-09-04 | End: 2025-09-05 | Stop reason: HOSPADM

## 2025-09-04 RX ORDER — DOCUSATE SODIUM 100 MG/1
100 CAPSULE, LIQUID FILLED ORAL 2 TIMES DAILY
Status: DISCONTINUED | OUTPATIENT
Start: 2025-09-04 | End: 2025-09-05 | Stop reason: HOSPADM

## 2025-09-04 RX ORDER — CELECOXIB 100 MG/1
100 CAPSULE ORAL ONCE
Status: COMPLETED | OUTPATIENT
Start: 2025-09-04 | End: 2025-09-04

## 2025-09-04 RX ORDER — CEFAZOLIN SODIUM 2 G/100ML
2 INJECTION, SOLUTION INTRAVENOUS
Status: COMPLETED | OUTPATIENT
Start: 2025-09-04 | End: 2025-09-04

## 2025-09-04 RX ORDER — ONDANSETRON HYDROCHLORIDE 2 MG/ML
INJECTION, SOLUTION INTRAVENOUS AS NEEDED
Status: DISCONTINUED | OUTPATIENT
Start: 2025-09-04 | End: 2025-09-04

## 2025-09-04 RX ORDER — OXYCODONE HYDROCHLORIDE 5 MG/1
10 TABLET ORAL EVERY 4 HOURS PRN
Status: DISCONTINUED | OUTPATIENT
Start: 2025-09-04 | End: 2025-09-04

## 2025-09-04 RX ORDER — POLYETHYLENE GLYCOL 3350 17 G/17G
17 POWDER, FOR SOLUTION ORAL DAILY
Status: DISCONTINUED | OUTPATIENT
Start: 2025-09-04 | End: 2025-09-05 | Stop reason: HOSPADM

## 2025-09-04 RX ORDER — LABETALOL HYDROCHLORIDE 5 MG/ML
5 INJECTION, SOLUTION INTRAVENOUS ONCE AS NEEDED
Status: DISCONTINUED | OUTPATIENT
Start: 2025-09-04 | End: 2025-09-04 | Stop reason: HOSPADM

## 2025-09-04 RX ORDER — NALOXONE HYDROCHLORIDE 4 MG/.1ML
4 SPRAY NASAL AS NEEDED
Status: DISCONTINUED | OUTPATIENT
Start: 2025-09-04 | End: 2025-09-05 | Stop reason: HOSPADM

## 2025-09-04 RX ORDER — OXYCODONE HYDROCHLORIDE 5 MG/1
5 TABLET ORAL EVERY 6 HOURS PRN
Status: DISCONTINUED | OUTPATIENT
Start: 2025-09-04 | End: 2025-09-04

## 2025-09-04 RX ORDER — KETOROLAC TROMETHAMINE 30 MG/ML
INJECTION, SOLUTION INTRAMUSCULAR; INTRAVENOUS AS NEEDED
Status: DISCONTINUED | OUTPATIENT
Start: 2025-09-04 | End: 2025-09-04

## 2025-09-04 RX ORDER — PROPOFOL 10 MG/ML
INJECTION, EMULSION INTRAVENOUS CONTINUOUS PRN
Status: DISCONTINUED | OUTPATIENT
Start: 2025-09-04 | End: 2025-09-04

## 2025-09-04 RX ORDER — OMEPRAZOLE 20 MG/1
20 CAPSULE, DELAYED RELEASE ORAL
Status: DISCONTINUED | OUTPATIENT
Start: 2025-09-05 | End: 2025-09-04

## 2025-09-04 RX ORDER — PANTOPRAZOLE SODIUM 40 MG/1
40 TABLET, DELAYED RELEASE ORAL
Status: DISCONTINUED | OUTPATIENT
Start: 2025-09-05 | End: 2025-09-05 | Stop reason: HOSPADM

## 2025-09-04 RX ORDER — CLONIDINE HYDROCHLORIDE 0.1 MG/1
0.1 TABLET ORAL DAILY
Status: DISCONTINUED | OUTPATIENT
Start: 2025-09-05 | End: 2025-09-05 | Stop reason: HOSPADM

## 2025-09-04 RX ORDER — MEPERIDINE HYDROCHLORIDE 25 MG/ML
12.5 INJECTION INTRAMUSCULAR; INTRAVENOUS; SUBCUTANEOUS EVERY 10 MIN PRN
Status: DISCONTINUED | OUTPATIENT
Start: 2025-09-04 | End: 2025-09-04 | Stop reason: HOSPADM

## 2025-09-04 RX ORDER — KETOROLAC TROMETHAMINE 15 MG/ML
15 INJECTION, SOLUTION INTRAMUSCULAR; INTRAVENOUS EVERY 6 HOURS
Status: DISCONTINUED | OUTPATIENT
Start: 2025-09-04 | End: 2025-09-05 | Stop reason: HOSPADM

## 2025-09-04 RX ORDER — ACETAMINOPHEN 325 MG/1
650 TABLET ORAL ONCE
Status: COMPLETED | OUTPATIENT
Start: 2025-09-04 | End: 2025-09-04

## 2025-09-04 RX ORDER — VANCOMYCIN HYDROCHLORIDE 1 G/20ML
INJECTION, POWDER, LYOPHILIZED, FOR SOLUTION INTRAVENOUS AS NEEDED
Status: DISCONTINUED | OUTPATIENT
Start: 2025-09-04 | End: 2025-09-04

## 2025-09-04 RX ORDER — HYDROMORPHONE HYDROCHLORIDE 0.2 MG/ML
0.2 INJECTION INTRAMUSCULAR; INTRAVENOUS; SUBCUTANEOUS EVERY 4 HOURS PRN
Status: DISCONTINUED | OUTPATIENT
Start: 2025-09-04 | End: 2025-09-05 | Stop reason: HOSPADM

## 2025-09-04 RX ORDER — TRANEXAMIC ACID 1 G/10ML
INJECTION, SOLUTION INTRAVENOUS AS NEEDED
Status: DISCONTINUED | OUTPATIENT
Start: 2025-09-04 | End: 2025-09-04

## 2025-09-04 RX ORDER — TRANEXAMIC ACID 650 MG/1
1950 TABLET ORAL ONCE
Status: COMPLETED | OUTPATIENT
Start: 2025-09-04 | End: 2025-09-04

## 2025-09-04 RX ADMIN — KETOROLAC TROMETHAMINE 15 MG: 30 INJECTION, SOLUTION INTRAMUSCULAR at 08:53

## 2025-09-04 RX ADMIN — Medication 8 MCG: at 07:10

## 2025-09-04 RX ADMIN — VANCOMYCIN 1 G: 1 INJECTION, SOLUTION INTRAVENOUS at 19:46

## 2025-09-04 RX ADMIN — CEFAZOLIN SODIUM 2 G: 2 INJECTION, SOLUTION INTRAVENOUS at 07:23

## 2025-09-04 RX ADMIN — DEXAMETHASONE SODIUM PHOSPHATE 8 MG: 4 INJECTION INTRA-ARTICULAR; INTRALESIONAL; INTRAMUSCULAR; INTRAVENOUS; SOFT TISSUE at 07:23

## 2025-09-04 RX ADMIN — TRANEXAMIC ACID 1000 MG: 100 INJECTION, SOLUTION INTRAVENOUS at 07:23

## 2025-09-04 RX ADMIN — SODIUM CHLORIDE, SODIUM LACTATE, POTASSIUM CHLORIDE, AND CALCIUM CHLORIDE: 600; 310; 30; 20 INJECTION, SOLUTION INTRAVENOUS at 08:53

## 2025-09-04 RX ADMIN — CEFAZOLIN SODIUM 2 G: 2 INJECTION, SOLUTION INTRAVENOUS at 14:39

## 2025-09-04 RX ADMIN — SCOPOLAMINE 1 PATCH: 1.5 PATCH, EXTENDED RELEASE TRANSDERMAL at 12:21

## 2025-09-04 RX ADMIN — SODIUM CHLORIDE, SODIUM LACTATE, POTASSIUM CHLORIDE, AND CALCIUM CHLORIDE 100 ML/HR: 600; 310; 30; 20 INJECTION, SOLUTION INTRAVENOUS at 12:15

## 2025-09-04 RX ADMIN — KETOROLAC TROMETHAMINE 15 MG: 15 INJECTION, SOLUTION INTRAMUSCULAR; INTRAVENOUS at 21:08

## 2025-09-04 RX ADMIN — VANCOMYCIN HYDROCHLORIDE 1.5 G: 1 INJECTION, POWDER, LYOPHILIZED, FOR SOLUTION INTRAVENOUS at 07:10

## 2025-09-04 RX ADMIN — ONDANSETRON 4 MG: 2 INJECTION, SOLUTION INTRAMUSCULAR; INTRAVENOUS at 07:10

## 2025-09-04 RX ADMIN — TRAMADOL HYDROCHLORIDE 50 MG: 50 TABLET, COATED ORAL at 12:15

## 2025-09-04 RX ADMIN — SODIUM CHLORIDE, SODIUM LACTATE, POTASSIUM CHLORIDE, AND CALCIUM CHLORIDE 50 ML/HR: 600; 310; 30; 20 INJECTION, SOLUTION INTRAVENOUS at 06:22

## 2025-09-04 RX ADMIN — SODIUM CHLORIDE, SODIUM LACTATE, POTASSIUM CHLORIDE, AND CALCIUM CHLORIDE: 600; 310; 30; 20 INJECTION, SOLUTION INTRAVENOUS at 07:10

## 2025-09-04 RX ADMIN — ORPHENADRINE CITRATE 60 MG: 60 INJECTION INTRAMUSCULAR; INTRAVENOUS at 21:08

## 2025-09-04 RX ADMIN — Medication 30 MG: at 09:10

## 2025-09-04 RX ADMIN — OLANZAPINE 10 MG: 2.5 TABLET, FILM COATED ORAL at 21:09

## 2025-09-04 RX ADMIN — PROPOFOL 75 MCG/KG/MIN: 10 INJECTION, EMULSION INTRAVENOUS at 07:24

## 2025-09-04 RX ADMIN — ACETAMINOPHEN 650 MG: 325 TABLET ORAL at 06:19

## 2025-09-04 RX ADMIN — KETOROLAC TROMETHAMINE 15 MG: 15 INJECTION, SOLUTION INTRAMUSCULAR; INTRAVENOUS at 14:38

## 2025-09-04 RX ADMIN — DOCUSATE SODIUM 100 MG: 100 CAPSULE, LIQUID FILLED ORAL at 21:08

## 2025-09-04 RX ADMIN — CEFAZOLIN SODIUM 2 G: 2 INJECTION, SOLUTION INTRAVENOUS at 23:22

## 2025-09-04 RX ADMIN — ACETAMINOPHEN 975 MG: 325 TABLET ORAL at 14:39

## 2025-09-04 RX ADMIN — PROPOFOL 30 MG: 10 INJECTION, EMULSION INTRAVENOUS at 08:46

## 2025-09-04 RX ADMIN — PROPOFOL 30 MG: 10 INJECTION, EMULSION INTRAVENOUS at 09:06

## 2025-09-04 RX ADMIN — PROPOFOL 20 MG: 10 INJECTION, EMULSION INTRAVENOUS at 07:23

## 2025-09-04 RX ADMIN — TRANEXAMIC ACID 1950 MG: 650 TABLET ORAL at 14:38

## 2025-09-04 RX ADMIN — CELECOXIB 100 MG: 100 CAPSULE ORAL at 06:19

## 2025-09-04 RX ADMIN — PROPOFOL 30 MG: 10 INJECTION, EMULSION INTRAVENOUS at 08:24

## 2025-09-04 RX ADMIN — FENTANYL CITRATE 50 MCG: 50 INJECTION INTRAMUSCULAR; INTRAVENOUS at 09:30

## 2025-09-04 RX ADMIN — PROPOFOL 30 MG: 10 INJECTION, EMULSION INTRAVENOUS at 09:00

## 2025-09-04 RX ADMIN — VANCOMYCIN 1.5 G: 1.5 INJECTION, SOLUTION INTRAVENOUS at 06:18

## 2025-09-04 RX ADMIN — ASPIRIN 81 MG: 81 TABLET, COATED ORAL at 21:08

## 2025-09-04 RX ADMIN — FENTANYL CITRATE 50 MCG: 50 INJECTION INTRAMUSCULAR; INTRAVENOUS at 09:35

## 2025-09-04 RX ADMIN — MEPIVACAINE HYDROCHLORIDE 3.5 ML: 15 INJECTION, SOLUTION EPIDURAL; INFILTRATION at 07:19

## 2025-09-04 SDOH — SOCIAL STABILITY: SOCIAL INSECURITY: DO YOU FEEL ANYONE HAS EXPLOITED OR TAKEN ADVANTAGE OF YOU FINANCIALLY OR OF YOUR PERSONAL PROPERTY?: NO

## 2025-09-04 SDOH — SOCIAL STABILITY: SOCIAL INSECURITY: WITHIN THE LAST YEAR, HAVE YOU BEEN HUMILIATED OR EMOTIONALLY ABUSED IN OTHER WAYS BY YOUR PARTNER OR EX-PARTNER?: NO

## 2025-09-04 SDOH — ECONOMIC STABILITY: FOOD INSECURITY: WITHIN THE PAST 12 MONTHS, THE FOOD YOU BOUGHT JUST DIDN'T LAST AND YOU DIDN'T HAVE MONEY TO GET MORE.: NEVER TRUE

## 2025-09-04 SDOH — SOCIAL STABILITY: SOCIAL INSECURITY: HAVE YOU HAD THOUGHTS OF HARMING ANYONE ELSE?: NO

## 2025-09-04 SDOH — SOCIAL STABILITY: SOCIAL INSECURITY: WITHIN THE LAST YEAR, HAVE YOU BEEN AFRAID OF YOUR PARTNER OR EX-PARTNER?: NO

## 2025-09-04 SDOH — ECONOMIC STABILITY: HOUSING INSECURITY: AT ANY TIME IN THE PAST 12 MONTHS, WERE YOU HOMELESS OR LIVING IN A SHELTER (INCLUDING NOW)?: NO

## 2025-09-04 SDOH — SOCIAL STABILITY: SOCIAL INSECURITY
WITHIN THE LAST YEAR, HAVE YOU BEEN KICKED, HIT, SLAPPED, OR OTHERWISE PHYSICALLY HURT BY YOUR PARTNER OR EX-PARTNER?: NO

## 2025-09-04 SDOH — ECONOMIC STABILITY: FOOD INSECURITY: WITHIN THE PAST 12 MONTHS, YOU WORRIED THAT YOUR FOOD WOULD RUN OUT BEFORE YOU GOT THE MONEY TO BUY MORE.: NEVER TRUE

## 2025-09-04 SDOH — ECONOMIC STABILITY: INCOME INSECURITY: IN THE PAST 12 MONTHS HAS THE ELECTRIC, GAS, OIL, OR WATER COMPANY THREATENED TO SHUT OFF SERVICES IN YOUR HOME?: NO

## 2025-09-04 SDOH — ECONOMIC STABILITY: FOOD INSECURITY: HOW HARD IS IT FOR YOU TO PAY FOR THE VERY BASICS LIKE FOOD, HOUSING, MEDICAL CARE, AND HEATING?: NOT HARD AT ALL

## 2025-09-04 SDOH — SOCIAL STABILITY: SOCIAL INSECURITY
WITHIN THE LAST YEAR, HAVE YOU BEEN RAPED OR FORCED TO HAVE ANY KIND OF SEXUAL ACTIVITY BY YOUR PARTNER OR EX-PARTNER?: NO

## 2025-09-04 SDOH — ECONOMIC STABILITY: HOUSING INSECURITY: IN THE PAST 12 MONTHS, HOW MANY TIMES HAVE YOU MOVED WHERE YOU WERE LIVING?: 1

## 2025-09-04 SDOH — ECONOMIC STABILITY: HOUSING INSECURITY: DO YOU FEEL UNSAFE GOING BACK TO THE PLACE WHERE YOU LIVE?: NO

## 2025-09-04 SDOH — ECONOMIC STABILITY: HOUSING INSECURITY: IN THE LAST 12 MONTHS, WAS THERE A TIME WHEN YOU WERE NOT ABLE TO PAY THE MORTGAGE OR RENT ON TIME?: NO

## 2025-09-04 SDOH — ECONOMIC STABILITY: TRANSPORTATION INSECURITY: IN THE PAST 12 MONTHS, HAS LACK OF TRANSPORTATION KEPT YOU FROM MEDICAL APPOINTMENTS OR FROM GETTING MEDICATIONS?: NO

## 2025-09-04 SDOH — SOCIAL STABILITY: SOCIAL INSECURITY: HAVE YOU HAD ANY THOUGHTS OF HARMING ANYONE ELSE?: NO

## 2025-09-04 SDOH — SOCIAL STABILITY: SOCIAL INSECURITY: ARE THERE ANY APPARENT SIGNS OF INJURIES/BEHAVIORS THAT COULD BE RELATED TO ABUSE/NEGLECT?: NO

## 2025-09-04 SDOH — SOCIAL STABILITY: SOCIAL INSECURITY: DOES ANYONE TRY TO KEEP YOU FROM HAVING/CONTACTING OTHER FRIENDS OR DOING THINGS OUTSIDE YOUR HOME?: NO

## 2025-09-04 SDOH — SOCIAL STABILITY: SOCIAL INSECURITY: ABUSE: ADULT

## 2025-09-04 SDOH — SOCIAL STABILITY: SOCIAL INSECURITY
ASK PARENT OR GUARDIAN: ARE THERE TIMES WHEN YOU, YOUR CHILD(REN), OR ANY MEMBER OF YOUR HOUSEHOLD FEEL UNSAFE, HARMED, OR THREATENED AROUND PERSONS WITH WHOM YOU KNOW OR LIVE?: NO

## 2025-09-04 SDOH — SOCIAL STABILITY: SOCIAL INSECURITY: WERE YOU ABLE TO COMPLETE ALL THE BEHAVIORAL HEALTH SCREENINGS?: YES

## 2025-09-04 SDOH — SOCIAL STABILITY: SOCIAL INSECURITY: HAS ANYONE EVER THREATENED TO HURT YOUR FAMILY OR YOUR PETS?: NO

## 2025-09-04 SDOH — SOCIAL STABILITY: SOCIAL INSECURITY: ARE YOU OR HAVE YOU BEEN THREATENED OR ABUSED PHYSICALLY, EMOTIONALLY, OR SEXUALLY BY ANYONE?: NO

## 2025-09-04 SDOH — SOCIAL STABILITY: SOCIAL INSECURITY: DO YOU FEEL UNSAFE GOING BACK TO THE PLACE WHERE YOU ARE LIVING?: NO

## 2025-09-04 ASSESSMENT — PAIN SCALES - GENERAL
PAINLEVEL_OUTOF10: 4
PAINLEVEL_OUTOF10: 4
PAINLEVEL_OUTOF10: 3
PAINLEVEL_OUTOF10: 0 - NO PAIN
PAINLEVEL_OUTOF10: 8
PAINLEVEL_OUTOF10: 2
PAINLEVEL_OUTOF10: 4
PAINLEVEL_OUTOF10: 1
PAINLEVEL_OUTOF10: 4
PAINLEVEL_OUTOF10: 7

## 2025-09-04 ASSESSMENT — COGNITIVE AND FUNCTIONAL STATUS - GENERAL
MOBILITY SCORE: 18
MOVING TO AND FROM BED TO CHAIR: A LITTLE
MOVING TO AND FROM BED TO CHAIR: A LITTLE
WALKING IN HOSPITAL ROOM: A LITTLE
DRESSING REGULAR LOWER BODY CLOTHING: A LITTLE
MOVING FROM LYING ON BACK TO SITTING ON SIDE OF FLAT BED WITH BEDRAILS: A LITTLE
TOILETING: A LITTLE
DRESSING REGULAR LOWER BODY CLOTHING: A LITTLE
MOVING FROM LYING ON BACK TO SITTING ON SIDE OF FLAT BED WITH BEDRAILS: A LITTLE
WALKING IN HOSPITAL ROOM: A LITTLE
STANDING UP FROM CHAIR USING ARMS: A LITTLE
MOBILITY SCORE: 17
TURNING FROM BACK TO SIDE WHILE IN FLAT BAD: A LITTLE
MOVING FROM LYING ON BACK TO SITTING ON SIDE OF FLAT BED WITH BEDRAILS: A LITTLE
CLIMB 3 TO 5 STEPS WITH RAILING: A LITTLE
PATIENT BASELINE BEDBOUND: NO
WALKING IN HOSPITAL ROOM: A LITTLE
DAILY ACTIVITIY SCORE: 20
DRESSING REGULAR UPPER BODY CLOTHING: A LITTLE
DRESSING REGULAR UPPER BODY CLOTHING: A LITTLE
DAILY ACTIVITIY SCORE: 20
CLIMB 3 TO 5 STEPS WITH RAILING: A LITTLE
MOBILITY SCORE: 18
TOILETING: A LITTLE
STANDING UP FROM CHAIR USING ARMS: A LITTLE
TURNING FROM BACK TO SIDE WHILE IN FLAT BAD: A LITTLE
TURNING FROM BACK TO SIDE WHILE IN FLAT BAD: A LOT
MOVING TO AND FROM BED TO CHAIR: A LITTLE
CLIMB 3 TO 5 STEPS WITH RAILING: A LITTLE
STANDING UP FROM CHAIR USING ARMS: A LITTLE
HELP NEEDED FOR BATHING: A LITTLE
HELP NEEDED FOR BATHING: A LITTLE

## 2025-09-04 ASSESSMENT — PAIN DESCRIPTION - DESCRIPTORS
DESCRIPTORS: ACHING;BURNING
DESCRIPTORS: ACHING
DESCRIPTORS: ACHING
DESCRIPTORS: ACHING;BURNING;SHARP
DESCRIPTORS: ACHING
DESCRIPTORS: ACHING

## 2025-09-04 ASSESSMENT — LIFESTYLE VARIABLES
SKIP TO QUESTIONS 9-10: 0
HOW MANY STANDARD DRINKS CONTAINING ALCOHOL DO YOU HAVE ON A TYPICAL DAY: 1 OR 2
HOW OFTEN DO YOU HAVE 6 OR MORE DRINKS ON ONE OCCASION: LESS THAN MONTHLY
HOW OFTEN DO YOU HAVE A DRINK CONTAINING ALCOHOL: MONTHLY OR LESS
AUDIT-C TOTAL SCORE: 2
AUDIT-C TOTAL SCORE: 2

## 2025-09-04 ASSESSMENT — ACTIVITIES OF DAILY LIVING (ADL)
TOILETING: NEEDS ASSISTANCE
ASSISTIVE_DEVICE: WALKER
LACK_OF_TRANSPORTATION: NO
DRESSING YOURSELF: INDEPENDENT
ADEQUATE_TO_COMPLETE_ADL: YES
HEARING - LEFT EAR: FUNCTIONAL
WALKS IN HOME: NEEDS ASSISTANCE
LACK_OF_TRANSPORTATION: NO
JUDGMENT_ADEQUATE_SAFELY_COMPLETE_DAILY_ACTIVITIES: YES
ADL_ASSISTANCE: INDEPENDENT
FEEDING YOURSELF: INDEPENDENT
GROOMING: INDEPENDENT
PATIENT'S MEMORY ADEQUATE TO SAFELY COMPLETE DAILY ACTIVITIES?: YES
HEARING - RIGHT EAR: FUNCTIONAL
BATHING: NEEDS ASSISTANCE

## 2025-09-04 ASSESSMENT — PAIN - FUNCTIONAL ASSESSMENT
PAIN_FUNCTIONAL_ASSESSMENT: 0-10

## 2025-09-04 ASSESSMENT — PATIENT HEALTH QUESTIONNAIRE - PHQ9
2. FEELING DOWN, DEPRESSED OR HOPELESS: NOT AT ALL
SUM OF ALL RESPONSES TO PHQ9 QUESTIONS 1 & 2: 0
1. LITTLE INTEREST OR PLEASURE IN DOING THINGS: NOT AT ALL

## 2025-09-05 ENCOUNTER — CLINICAL SUPPORT (OUTPATIENT)
Dept: INPATIENT UNIT | Facility: HOSPITAL | Age: 75
End: 2025-09-05
Payer: MEDICARE

## 2025-09-05 VITALS
RESPIRATION RATE: 16 BRPM | WEIGHT: 153.22 LBS | HEIGHT: 61 IN | SYSTOLIC BLOOD PRESSURE: 115 MMHG | DIASTOLIC BLOOD PRESSURE: 76 MMHG | BODY MASS INDEX: 28.93 KG/M2 | HEART RATE: 91 BPM | TEMPERATURE: 98.8 F | OXYGEN SATURATION: 94 %

## 2025-09-05 PROBLEM — M54.9 BACK PAIN: Status: RESOLVED | Noted: 2023-01-25 | Resolved: 2025-09-05

## 2025-09-05 PROBLEM — M16.9 OA (OSTEOARTHRITIS) OF HIP: Status: RESOLVED | Noted: 2025-09-04 | Resolved: 2025-09-05

## 2025-09-05 PROBLEM — M16.12 PRIMARY OSTEOARTHRITIS OF LEFT HIP: Status: RESOLVED | Noted: 2025-08-22 | Resolved: 2025-09-05

## 2025-09-05 LAB
ANION GAP SERPL CALC-SCNC: 15 MMOL/L (ref 10–20)
BUN SERPL-MCNC: 10 MG/DL (ref 6–23)
CALCIUM SERPL-MCNC: 8.4 MG/DL (ref 8.6–10.3)
CHLORIDE SERPL-SCNC: 101 MMOL/L (ref 98–107)
CO2 SERPL-SCNC: 19 MMOL/L (ref 21–32)
CREAT SERPL-MCNC: 0.78 MG/DL (ref 0.5–1.05)
EGFRCR SERPLBLD CKD-EPI 2021: 79 ML/MIN/1.73M*2
ERYTHROCYTE [DISTWIDTH] IN BLOOD BY AUTOMATED COUNT: 14.5 % (ref 11.5–14.5)
GLUCOSE SERPL-MCNC: 143 MG/DL (ref 74–99)
HCT VFR BLD AUTO: 32 % (ref 36–46)
HGB BLD-MCNC: 10.8 G/DL (ref 12–16)
MCH RBC QN AUTO: 26.7 PG (ref 26–34)
MCHC RBC AUTO-ENTMCNC: 33.8 G/DL (ref 32–36)
MCV RBC AUTO: 79 FL (ref 80–100)
PLATELET # BLD AUTO: 300 X10*3/UL (ref 150–450)
POTASSIUM SERPL-SCNC: 4 MMOL/L (ref 3.5–5.3)
RBC # BLD AUTO: 4.04 X10*6/UL (ref 4–5.2)
SODIUM SERPL-SCNC: 131 MMOL/L (ref 136–145)
WBC # BLD AUTO: 15.2 X10*3/UL (ref 4.4–11.3)

## 2025-09-05 PROCEDURE — 97116 GAIT TRAINING THERAPY: CPT | Mod: GP

## 2025-09-05 PROCEDURE — 97530 THERAPEUTIC ACTIVITIES: CPT | Mod: GP

## 2025-09-05 PROCEDURE — 99221 1ST HOSP IP/OBS SF/LOW 40: CPT | Performed by: EMERGENCY MEDICINE

## 2025-09-05 PROCEDURE — 2500000004 HC RX 250 GENERAL PHARMACY W/ HCPCS (ALT 636 FOR OP/ED): Mod: JZ

## 2025-09-05 PROCEDURE — 85027 COMPLETE CBC AUTOMATED: CPT

## 2025-09-05 PROCEDURE — 7100000011 HC EXTENDED STAY RECOVERY HOURLY - NURSING UNIT

## 2025-09-05 PROCEDURE — 36415 COLL VENOUS BLD VENIPUNCTURE: CPT

## 2025-09-05 PROCEDURE — 93005 ELECTROCARDIOGRAM TRACING: CPT

## 2025-09-05 PROCEDURE — 82374 ASSAY BLOOD CARBON DIOXIDE: CPT

## 2025-09-05 PROCEDURE — 2500000001 HC RX 250 WO HCPCS SELF ADMINISTERED DRUGS (ALT 637 FOR MEDICARE OP)

## 2025-09-05 RX ADMIN — CEFAZOLIN SODIUM 2 G: 2 INJECTION, SOLUTION INTRAVENOUS at 06:11

## 2025-09-05 RX ADMIN — ASPIRIN 81 MG: 81 TABLET, COATED ORAL at 09:45

## 2025-09-05 RX ADMIN — PANTOPRAZOLE SODIUM 40 MG: 40 TABLET, DELAYED RELEASE ORAL at 06:11

## 2025-09-05 RX ADMIN — BUPROPION HYDROCHLORIDE 300 MG: 150 TABLET, EXTENDED RELEASE ORAL at 09:45

## 2025-09-05 RX ADMIN — ACETAMINOPHEN 975 MG: 325 TABLET ORAL at 06:11

## 2025-09-05 RX ADMIN — KETOROLAC TROMETHAMINE 15 MG: 15 INJECTION, SOLUTION INTRAMUSCULAR; INTRAVENOUS at 03:30

## 2025-09-05 ASSESSMENT — COGNITIVE AND FUNCTIONAL STATUS - GENERAL
MOBILITY SCORE: 18
CLIMB 3 TO 5 STEPS WITH RAILING: A LITTLE
TOILETING: A LITTLE
MOVING FROM LYING ON BACK TO SITTING ON SIDE OF FLAT BED WITH BEDRAILS: A LITTLE
MOVING TO AND FROM BED TO CHAIR: A LITTLE
WALKING IN HOSPITAL ROOM: A LITTLE
MOBILITY SCORE: 23
HELP NEEDED FOR BATHING: A LITTLE
TURNING FROM BACK TO SIDE WHILE IN FLAT BAD: A LITTLE
CLIMB 3 TO 5 STEPS WITH RAILING: A LITTLE
DRESSING REGULAR LOWER BODY CLOTHING: A LITTLE
STANDING UP FROM CHAIR USING ARMS: A LITTLE
DAILY ACTIVITIY SCORE: 20
DRESSING REGULAR UPPER BODY CLOTHING: A LITTLE

## 2025-09-05 ASSESSMENT — PAIN - FUNCTIONAL ASSESSMENT
PAIN_FUNCTIONAL_ASSESSMENT: 0-10
PAIN_FUNCTIONAL_ASSESSMENT: 0-10
PAIN_FUNCTIONAL_ASSESSMENT: UNABLE TO SELF-REPORT
PAIN_FUNCTIONAL_ASSESSMENT: UNABLE TO SELF-REPORT

## 2025-09-05 ASSESSMENT — PAIN SCALES - GENERAL
PAINLEVEL_OUTOF10: 2
PAINLEVEL_OUTOF10: 0 - NO PAIN

## 2025-09-05 ASSESSMENT — PAIN DESCRIPTION - DESCRIPTORS: DESCRIPTORS: ACHING

## 2025-09-06 ENCOUNTER — HOME CARE VISIT (OUTPATIENT)
Dept: HOME HEALTH SERVICES | Facility: HOME HEALTH | Age: 75
End: 2025-09-06
Payer: MEDICARE

## 2025-09-06 VITALS
BODY MASS INDEX: 29.27 KG/M2 | TEMPERATURE: 98.1 F | HEIGHT: 61 IN | SYSTOLIC BLOOD PRESSURE: 112 MMHG | OXYGEN SATURATION: 98 % | HEART RATE: 97 BPM | RESPIRATION RATE: 16 BRPM | DIASTOLIC BLOOD PRESSURE: 76 MMHG | WEIGHT: 155 LBS

## 2025-09-06 PROCEDURE — G0151 HHCP-SERV OF PT,EA 15 MIN: HCPCS | Mod: HHH

## 2025-09-06 PROCEDURE — 169592 NO-PAY CLAIM PROCEDURE

## 2025-09-06 ASSESSMENT — ENCOUNTER SYMPTOMS
PAIN LOCATION - EXACERBATING FACTORS: WALKING AND EXERCISE
SLEEP QUALITY: ADEQUATE
LIMITED RANGE OF MOTION: 1
ANGER WITHIN DEFINED LIMITS: 1
PAIN LOCATION - RELIEVING FACTORS: ICE AND MEDS
MUSCLE WEAKNESS: 1
AGGRESSION WITHIN DEFINED LIMITS: 1
PAIN LOCATION: LEFT HIP
HYPERTENSION: 1
LOWEST PAIN SEVERITY IN PAST 24 HOURS: 1/10
HIGHEST PAIN SEVERITY IN PAST 24 HOURS: 3/10
PAIN: 1
PAIN LOCATION - PAIN QUALITY: DULL AND ACHY
PERSON REPORTING PAIN: PATIENT
SUBJECTIVE PAIN PROGRESSION: WAXING AND WANING
PAIN LOCATION - PAIN SEVERITY: 1/10
PAIN LOCATION - PAIN FREQUENCY: CONSTANT
PAIN SEVERITY GOAL: 1/10

## 2025-09-06 ASSESSMENT — ACTIVITIES OF DAILY LIVING (ADL)
CURRENT_FUNCTION: ONE PERSON
DRESSING_LB_CURRENT_FUNCTION: CONTACT GUARD ASSIST
PHYSICAL TRANSFERS ASSESSED: 1
AMBULATION ASSISTANCE: ONE PERSON
CURRENT_FUNCTION: CONTACT GUARD ASSIST
FEEDING: SUPERVISION
AMBULATION ASSISTANCE ON FLAT SURFACES: 1
FEEDING ASSESSED: 1
AMBULATION ASSISTANCE: CONTACT GUARD ASSIST
TOILETING: 1
OASIS_M1830: 02
ENTERING_EXITING_HOME: CONTACT GUARD ASSIST
DRESSING_UB_CURRENT_FUNCTION: CONTACT GUARD ASSIST
AMBULATION_DISTANCE/DURATION_TOLERATED: 75 FEET X 2
AMBULATION ASSISTANCE: 1
TOILETING: CONTACT GUARD ASSIST

## 2025-09-16 ENCOUNTER — APPOINTMENT (OUTPATIENT)
Dept: PREADMISSION TESTING | Facility: HOSPITAL | Age: 75
End: 2025-09-16
Payer: MEDICARE

## 2025-09-17 ENCOUNTER — APPOINTMENT (OUTPATIENT)
Dept: ORTHOPEDIC SURGERY | Facility: CLINIC | Age: 75
End: 2025-09-17
Payer: MEDICARE

## 2025-10-01 ENCOUNTER — APPOINTMENT (OUTPATIENT)
Dept: ORTHOPEDIC SURGERY | Facility: CLINIC | Age: 75
End: 2025-10-01
Payer: MEDICARE

## 2025-10-15 ENCOUNTER — APPOINTMENT (OUTPATIENT)
Dept: PRIMARY CARE | Facility: CLINIC | Age: 75
End: 2025-10-15
Payer: MEDICARE

## 2025-10-15 ENCOUNTER — APPOINTMENT (OUTPATIENT)
Dept: ORTHOPEDIC SURGERY | Facility: CLINIC | Age: 75
End: 2025-10-15
Payer: MEDICARE

## 2025-12-17 ENCOUNTER — APPOINTMENT (OUTPATIENT)
Dept: ORTHOPEDIC SURGERY | Facility: CLINIC | Age: 75
End: 2025-12-17
Payer: MEDICARE

## (undated) DEVICE — MARKER, SURGICAL, SKIN, REG TIP, W/ RULER & LABELS

## (undated) DEVICE — SYRINGE, 60 CC, IRRIGATION, BULB, CONTRO-BULB, PAPER POUCH

## (undated) DEVICE — BANDAGE, ELASTIC, ACE, ACE, DOUBLE LENGTH, 6 X 550 IN, LF

## (undated) DEVICE — DRAPE, ISOLATION, INCISE, W/POUCH, STERI DRAPE, 125 X 83 IN, DISPOSABLE, STERILE

## (undated) DEVICE — SUTURE, MONOCRYL, 3-0, PS-1 27IN, UNDYED

## (undated) DEVICE — DRAPE, INCISE, ANTIMICROBIAL, IOBAN 2, STERI DRAPE, 23 X 33 IN, DISPOSABLE, STERILE

## (undated) DEVICE — TRAY, DRY PREP, PREMIUM

## (undated) DEVICE — GLOVE, SURGICAL, PROTEXIS PI BLUE W/NEUTHERA, 8.0, PF, LF

## (undated) DEVICE — TUBING, IRRIGATION, HIGH FLOW, HAND PIECE SET

## (undated) DEVICE — HOOD, SURGICAL, FLYTE HYBRID

## (undated) DEVICE — Device

## (undated) DEVICE — NEEDLE, SPINAL, S/SU, 18GA 3IN, QUINCKE, STERILE

## (undated) DEVICE — SUTURE, ETHIBOND XTRA, 5 V-37, GRN/BR, LF

## (undated) DEVICE — SYRINGE, 50 CC, LUER LOCK

## (undated) DEVICE — SOLUTION, CHLORHEXIDINE, 4%, 4OZ

## (undated) DEVICE — ADULT REM POLYHESIVE II PATIENT RETURN ELECTRODE W/9 FT (2.7 M) ATTACHED CORD

## (undated) DEVICE — STRIP, SKIN CLOSURE, STERI STRIP, REINFORCED, 0.5 X 4 IN

## (undated) DEVICE — BANDAGE, COFLEX, 4 X 5 YDS, TAN, STERILE, LF

## (undated) DEVICE — GLOVE, SURGICAL, PROTEXIS PI , 8.0, PF, LF

## (undated) DEVICE — DRAPE, ISOLATION, ANTIMICROBIAL, W/POUCH, IOBAN 2, STERI DRAPE, 125 X 83 IN, DISPOSABLE, STERILE

## (undated) DEVICE — SUTURE, VICRYL, 0, 36 IN, CT-1, UNDYED

## (undated) DEVICE — SOLUTION, TOPICAL, ALCOHOL, 70% ISOPROPYL, 4OZ